# Patient Record
Sex: MALE | Race: WHITE | NOT HISPANIC OR LATINO | Employment: OTHER | URBAN - METROPOLITAN AREA
[De-identification: names, ages, dates, MRNs, and addresses within clinical notes are randomized per-mention and may not be internally consistent; named-entity substitution may affect disease eponyms.]

---

## 2021-02-18 ENCOUNTER — OFFICE VISIT (OUTPATIENT)
Dept: FAMILY MEDICINE | Facility: CLINIC | Age: 31
End: 2021-02-18
Payer: COMMERCIAL

## 2021-02-18 VITALS
DIASTOLIC BLOOD PRESSURE: 72 MMHG | TEMPERATURE: 98.2 F | RESPIRATION RATE: 16 BRPM | SYSTOLIC BLOOD PRESSURE: 125 MMHG | HEIGHT: 70 IN | OXYGEN SATURATION: 100 % | WEIGHT: 187 LBS | BODY MASS INDEX: 26.77 KG/M2 | HEART RATE: 66 BPM

## 2021-02-18 DIAGNOSIS — Z23 NEED FOR TETANUS BOOSTER: ICD-10-CM

## 2021-02-18 DIAGNOSIS — Z00.00 ROUTINE GENERAL MEDICAL EXAMINATION AT A HEALTH CARE FACILITY: Primary | ICD-10-CM

## 2021-02-18 PROCEDURE — 90471 IMMUNIZATION ADMIN: CPT | Performed by: PHYSICIAN ASSISTANT

## 2021-02-18 PROCEDURE — 90715 TDAP VACCINE 7 YRS/> IM: CPT | Performed by: PHYSICIAN ASSISTANT

## 2021-02-18 PROCEDURE — 99395 PREV VISIT EST AGE 18-39: CPT | Mod: 25 | Performed by: PHYSICIAN ASSISTANT

## 2021-02-18 ASSESSMENT — ENCOUNTER SYMPTOMS
SHORTNESS OF BREATH: 0
NERVOUS/ANXIOUS: 1
FREQUENCY: 0
SORE THROAT: 0
CONSTIPATION: 0
DIZZINESS: 0
MYALGIAS: 0
PALPITATIONS: 0
WEAKNESS: 0
DIARRHEA: 0
CHILLS: 0
HEMATURIA: 0
PARESTHESIAS: 0
HEARTBURN: 0
NAUSEA: 0
HEMATOCHEZIA: 0
JOINT SWELLING: 0
ARTHRALGIAS: 0
EYE PAIN: 0
COUGH: 0
DYSURIA: 0
FEVER: 0
ABDOMINAL PAIN: 0
HEADACHES: 0

## 2021-02-18 ASSESSMENT — MIFFLIN-ST. JEOR: SCORE: 1819.48
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
## 2021-02-18 NOTE — NURSING NOTE
Prior to immunization administration, verified patients identity using patient s name and date of birth. Please see Immunization Activity for additional information.     Screening Questionnaire for Adult Immunization    Are you sick today?   No   Do you have allergies to medications, food, a vaccine component or latex?   No   Have you ever had a serious reaction after receiving a vaccination?   No   Do you have a long-term health problem with heart, lung, kidney, or metabolic disease (e.g., diabetes), asthma, a blood disorder, no spleen, complement component deficiency, a cochlear implant, or a spinal fluid leak?  Are you on long-term aspirin therapy?   No   Do you have cancer, leukemia, HIV/AIDS, or any other immune system problem?   No   Do you have a parent, brother, or sister with an immune system problem?   No   In the past 3 months, have you taken medications that affect  your immune system, such as prednisone, other steroids, or anticancer drugs; drugs for the treatment of rheumatoid arthritis, Crohn s disease, or psoriasis; or have you had radiation treatments?   No   Have you had a seizure, or a brain or other nervous system problem?   No   During the past year, have you received a transfusion of blood or blood    products, or been given immune (gamma) globulin or antiviral drug?   No   For women: Are you pregnant or is there a chance you could become       pregnant during the next month?   No   Have you received any vaccinations in the past 4 weeks?   No     Immunization questionnaire answers were all negative.        Per orders of Dr. deleon, injection of tdap given by Reena Castellon MA. Patient instructed to remain in clinic for 15 minutes afterwards, and to report any adverse reaction to me immediately.       Screening performed by Reena Casteloln MA on 2/18/2021 at 4:04 PM.

## 2021-04-12 ENCOUNTER — OFFICE VISIT (OUTPATIENT)
Dept: FAMILY MEDICINE | Facility: CLINIC | Age: 31
End: 2021-04-12
Payer: COMMERCIAL

## 2021-04-12 VITALS
RESPIRATION RATE: 16 BRPM | HEIGHT: 70 IN | BODY MASS INDEX: 26.63 KG/M2 | SYSTOLIC BLOOD PRESSURE: 134 MMHG | DIASTOLIC BLOOD PRESSURE: 70 MMHG | WEIGHT: 186 LBS | HEART RATE: 87 BPM | TEMPERATURE: 98.5 F | OXYGEN SATURATION: 97 %

## 2021-04-12 DIAGNOSIS — R53.83 FATIGUE, UNSPECIFIED TYPE: Primary | ICD-10-CM

## 2021-04-12 DIAGNOSIS — R11.0 NAUSEA: ICD-10-CM

## 2021-04-12 DIAGNOSIS — R09.81 NASAL CONGESTION: ICD-10-CM

## 2021-04-12 DIAGNOSIS — F43.23 ADJUSTMENT DISORDER WITH MIXED ANXIETY AND DEPRESSED MOOD: ICD-10-CM

## 2021-04-12 PROCEDURE — U0005 INFEC AGEN DETEC AMPLI PROBE: HCPCS | Performed by: FAMILY MEDICINE

## 2021-04-12 PROCEDURE — U0003 INFECTIOUS AGENT DETECTION BY NUCLEIC ACID (DNA OR RNA); SEVERE ACUTE RESPIRATORY SYNDROME CORONAVIRUS 2 (SARS-COV-2) (CORONAVIRUS DISEASE [COVID-19]), AMPLIFIED PROBE TECHNIQUE, MAKING USE OF HIGH THROUGHPUT TECHNOLOGIES AS DESCRIBED BY CMS-2020-01-R: HCPCS | Performed by: FAMILY MEDICINE

## 2021-04-12 PROCEDURE — 99214 OFFICE O/P EST MOD 30 MIN: CPT | Performed by: FAMILY MEDICINE

## 2021-04-12 RX ORDER — LISINOPRIL AND HYDROCHLOROTHIAZIDE 12.5; 2 MG/1; MG/1
TABLET ORAL
COMMUNITY
End: 2021-05-29

## 2021-04-12 ASSESSMENT — ANXIETY QUESTIONNAIRES
7. FEELING AFRAID AS IF SOMETHING AWFUL MIGHT HAPPEN: MORE THAN HALF THE DAYS
3. WORRYING TOO MUCH ABOUT DIFFERENT THINGS: MORE THAN HALF THE DAYS
6. BECOMING EASILY ANNOYED OR IRRITABLE: SEVERAL DAYS
5. BEING SO RESTLESS THAT IT IS HARD TO SIT STILL: SEVERAL DAYS
IF YOU CHECKED OFF ANY PROBLEMS ON THIS QUESTIONNAIRE, HOW DIFFICULT HAVE THESE PROBLEMS MADE IT FOR YOU TO DO YOUR WORK, TAKE CARE OF THINGS AT HOME, OR GET ALONG WITH OTHER PEOPLE: VERY DIFFICULT
2. NOT BEING ABLE TO STOP OR CONTROL WORRYING: NEARLY EVERY DAY
1. FEELING NERVOUS, ANXIOUS, OR ON EDGE: NEARLY EVERY DAY
GAD7 TOTAL SCORE: 14

## 2021-04-12 ASSESSMENT — COLUMBIA-SUICIDE SEVERITY RATING SCALE - C-SSRS
2. IN THE PAST MONTH, HAVE YOU ACTUALLY HAD ANY THOUGHTS OF KILLING YOURSELF?: NO
1. WITHIN THE PAST MONTH, HAVE YOU WISHED YOU WERE DEAD OR WISHED YOU COULD GO TO SLEEP AND NOT WAKE UP?: YES
6. HAVE YOU EVER DONE ANYTHING, STARTED TO DO ANYTHING, OR PREPARED TO DO ANYTHING TO END YOUR LIFE?: NO

## 2021-04-12 ASSESSMENT — MIFFLIN-ST. JEOR: SCORE: 1814.94

## 2021-04-12 ASSESSMENT — PATIENT HEALTH QUESTIONNAIRE - PHQ9
SUM OF ALL RESPONSES TO PHQ QUESTIONS 1-9: 13
5. POOR APPETITE OR OVEREATING: MORE THAN HALF THE DAYS

## 2021-04-12 NOTE — PATIENT INSTRUCTIONS
Monitor your symptoms and if low mood, anxiety, and fatigue persist let me know as I would then recommend lab testing.      See the EAP therapist.  If mood and anxiety issues persist over the next few weeks let me know as we could consider another trial of citalopram.

## 2021-04-12 NOTE — PROGRESS NOTES
Assessment & Plan     Fatigue, unspecified type  Nausea  Nasal congestion  Onset of symptoms correlates with COVID vaccine #1 given last week but symptoms have been more persistent than expected for vaccine side effects and I think we need to rule out simultaneous COVID illness given the community prevalence.  His symptoms do seem to be improving, albeit slowly.  I recommended he continue to monitor these physical symptoms and if they persist we should consider additional workup (CMP, CBC, TSH).    - Symptomatic COVID-19 Virus (Coronavirus) by PCR    Adjustment disorder with mixed anxiety and depressed mood  He's been coping with many stressors over the winter (move to new state, new demanding job, pandemic).  He notes passive SI with no intent or plan.  I recommended that he start working with an EAP therapist that is available to him and monitor his symptoms.  He notes that the grey, rainy days this past week have been challenging and that [place] was much sunnier.  I am hopeful that with spring fast approaching the change in weather may also lighten his mood.  I recommended he reach out to me if still struggling with mood in the next few weeks and we could have him start citalopram at that time (citalopram as he responded well to it several years ago).         Depression Screening Follow Up  PHQ 4/12/2021   PHQ-9 Total Score 13   Q9: Thoughts of better off dead/self-harm past 2 weeks Several days     C-SSRS (Brief Stephenson) 4/12/2021   Within the last month, have you wished you were dead or wished you could go to sleep and not wake up? Yes   Within the last month, have you had any actual thoughts of killing yourself? No   Within the last month, have you ever done anything, started to do anything, or prepared to do anything to end your life? No     Follow Up  Follow Up Actions Taken  Crisis resource information provided in the After Visit Summary  Recommended counseling/therapy with EAP         39 minutes spent on  the date of the encounter doing chart review, history and exam, documentation and further activities per the note            Patient Instructions   Monitor your symptoms and if low mood, anxiety, and fatigue persist let me know as I would then recommend lab testing.      See the EAP therapist.  If mood and anxiety issues persist over the next few weeks let me know as we could consider another trial of citalopram.        No follow-ups on file.    Юлия Jones MD  [facility]        Subjective   AJ is a 30 year old who presents for the following health issues        Acute Illness  Onset/Duration: Wed 4/7/2020  Symptoms:  Fever: no  Chills/Sweats: YES- sensitive to cold  Headache (location?): no  Sinus Pressure: YES- yesterday  Conjunctivitis:  no  Ear Pain: no  Rhinorrhea: no  Congestion: YES  Sore Throat: no  Cough: YES - on wed but its gone now  Wheeze: no  Decreased Appetite: no  Nausea: YES  Vomiting: no  Diarrhea: no  Dysuria/Freq.: no  Dysuria or Hematuria: no  Fatigue/Achiness: YES  Sick/Strep Exposure: no  Therapies tried and outcome: took advil, breathing exercises, sleeping early, but not helping    Recently moved from [place] to [place] with his abhijit who works at [employer].      Anxiety and stress over the last 2 months. Patient states he got his first covid vaccine (Pfizer) 7 days ago (on 4/6) and felt tired that day. The next day on Wednesday he still felt tired, shaky and had a sore left arm. His legs feel weak and he's having a hard time concentrating at work, low mood, feels confused and feels like he's having anxiety and depressive thoughts. He is too tired to exercise which is how he's been coping with mood and anxiety issues the past couple of months. Fatigue is a bit better today.  He wonders the Covid Shot might be causing these symptoms.     Abnormal Mood Symptoms  Onset/Duration: months, worsened past week  Description:   Depression (if yes, do PHQ-9):  "YES  Anxiety (if yes, do MATTHEW-7): YES  Accompanying Signs & Symptoms:  Fatigue: YES  Difficulty concentrating: YES  Heart racing/beating fast: YES  Thoughts of hurting yourself or others: no  History:  Recent stress or major life event: YES- new job started in the fall, very demanding  Prior depression or anxiety: Yes - 2016, took citalopram for a year, responded well but gained weight  Family history of depression or anxiety: no - not diagnosed but dad may have had anxiety  Alcohol/drug use: YES- has cut back to 1-2 drinks/week  Difficulty sleeping: no    He describes his anxiety as overall worry but thinks he may have been close to having a panic attack once - heart was racing.  PHQ 4/12/2021   PHQ-9 Total Score 13   Q9: Thoughts of better off dead/self-harm past 2 weeks Several days     MATTHEW-7 SCORE 4/12/2021   Total Score 14         Review of Systems   as above       Objective    /70 (BP Location: Right arm, Patient Position: Sitting, Cuff Size: Adult Regular)   Pulse 87   Temp 98.5  F (36.9  C) (Temporal)   Resp 16   Ht 1.778 m (5' 10\")   Wt 84.4 kg (186 lb)   SpO2 97%   BMI 26.69 kg/m    Body mass index is 26.69 kg/m .  Physical Exam   GEN:  no apparent distress  EYES: sclerae and conjunctivae clear with no discharge  ENT: external ears and nose without lesions or scars, TM's and canals clear bilaterally and oropharynx clear with moist mucus membranes and normal landmarks  NECK:  Supple without adenopathy, mass, or thyromegaly  LUNGS:  normal respiratory effort, and lungs clear to auscultation bilaterally - no rales, rhonchi or wheezes  CV: regular rate and rhythm, normal S1 S2, no S3 or S4 and no murmur, click or rub  ABD:  soft, nontender, no mass, no hepatosplenomegaly, no hernias   PSYCH:    Appearance: appropriately and casually dressed    Eye Contact: good  Behavior: calm  Attitude: cooperative and attentive    Speech:  normal rate, rhythm, and tone    Thought Form: organized and goal oriented "    Thought Content: no evidence of suicidal ideation or homicidal ideation    Mood: anxious    Affect: mood congruent    Insight: good

## 2021-04-13 LAB
LABORATORY COMMENT REPORT: NORMAL
SARS-COV-2 RNA RESP QL NAA+PROBE: NEGATIVE
SARS-COV-2 RNA RESP QL NAA+PROBE: NORMAL
SPECIMEN SOURCE: NORMAL
SPECIMEN SOURCE: NORMAL

## 2021-04-13 ASSESSMENT — ANXIETY QUESTIONNAIRES: GAD7 TOTAL SCORE: 14

## 2021-04-13 NOTE — RESULT ENCOUNTER NOTE
Yosef STODDARD,  Good news!  Your COVID-19 test was negative.  Hopefully your physical symptoms following the immunization are continuing to improve.  Keep us posted if you continue to have symptoms or mood concerns.  Юлия Jones MD

## 2021-05-06 ENCOUNTER — TELEPHONE (OUTPATIENT)
Dept: FAMILY MEDICINE | Facility: CLINIC | Age: 31
End: 2021-05-06

## 2021-05-06 NOTE — TELEPHONE ENCOUNTER
"Per 4/12/21 office visit:  \"Monitor your symptoms and if low mood, anxiety, and fatigue persist let me know as I would then recommend lab testing.  \"    Clarification needed from patient if/what symptoms are present.    SUNNY TovarN, RN  [facility]    "

## 2021-05-06 NOTE — TELEPHONE ENCOUNTER
Reason for call:  Other   Patient called regarding (reason for call): appointment  Additional comments: Pt had evisit with Provider recently and  and was informed to have covid test done first to rule that out. Pt informed to contact Provider back to explore medication one Covid ruled out     Phone number to reach patient:  Home number on file [phone] (home)    Best Time:  today    Can we leave a detailed message on this number?  YES    Travel screening: Not Applicable

## 2021-05-06 NOTE — TELEPHONE ENCOUNTER
Pt called- States he is still feeling tired and anxious- Feels he needs to start on anxiety/depression medication. Appointment made for tomorrow. Sandrine Escalona RN

## 2021-05-07 ENCOUNTER — VIRTUAL VISIT (OUTPATIENT)
Dept: FAMILY MEDICINE | Facility: CLINIC | Age: 31
End: 2021-05-07
Payer: COMMERCIAL

## 2021-05-07 DIAGNOSIS — F43.23 ADJUSTMENT DISORDER WITH MIXED ANXIETY AND DEPRESSED MOOD: Primary | ICD-10-CM

## 2021-05-07 PROCEDURE — 99214 OFFICE O/P EST MOD 30 MIN: CPT | Mod: GT | Performed by: PHYSICIAN ASSISTANT

## 2021-05-07 RX ORDER — HYDROXYZINE PAMOATE 25 MG/1
25 CAPSULE ORAL 3 TIMES DAILY PRN
Qty: 20 CAPSULE | Refills: 1 | Status: SHIPPED | OUTPATIENT
Start: 2021-05-07 | End: 2023-06-14

## 2021-05-07 RX ORDER — CITALOPRAM HYDROBROMIDE 20 MG/1
20 TABLET ORAL DAILY
Qty: 60 TABLET | Refills: 0 | Status: SHIPPED | OUTPATIENT
Start: 2021-05-07 | End: 2021-05-19 | Stop reason: ALTCHOICE

## 2021-05-07 ASSESSMENT — ANXIETY QUESTIONNAIRES
5. BEING SO RESTLESS THAT IT IS HARD TO SIT STILL: SEVERAL DAYS
3. WORRYING TOO MUCH ABOUT DIFFERENT THINGS: MORE THAN HALF THE DAYS
IF YOU CHECKED OFF ANY PROBLEMS ON THIS QUESTIONNAIRE, HOW DIFFICULT HAVE THESE PROBLEMS MADE IT FOR YOU TO DO YOUR WORK, TAKE CARE OF THINGS AT HOME, OR GET ALONG WITH OTHER PEOPLE: VERY DIFFICULT
2. NOT BEING ABLE TO STOP OR CONTROL WORRYING: MORE THAN HALF THE DAYS
1. FEELING NERVOUS, ANXIOUS, OR ON EDGE: MORE THAN HALF THE DAYS
GAD7 TOTAL SCORE: 11
7. FEELING AFRAID AS IF SOMETHING AWFUL MIGHT HAPPEN: MORE THAN HALF THE DAYS
6. BECOMING EASILY ANNOYED OR IRRITABLE: SEVERAL DAYS

## 2021-05-07 ASSESSMENT — PATIENT HEALTH QUESTIONNAIRE - PHQ9
SUM OF ALL RESPONSES TO PHQ QUESTIONS 1-9: 12
5. POOR APPETITE OR OVEREATING: SEVERAL DAYS

## 2021-05-07 NOTE — PROGRESS NOTES
RICHI is a 30 year old who is being evaluated via a billable video visit.      How would you like to obtain your AVS? MyChart  If the video visit is dropped, the invitation should be resent by: Text to cell phone: [phone]  Will anyone else be joining your video visit? No      Video Start Time: 3:14 PM    Assessment & Plan     Adjustment disorder with mixed anxiety and depressed mood  Worsening depression and anxiety. Has been on Celexa in the past with good results. Will restart today. Follow up in 4-6 weeks for medication check and possible dose adjustment. Return to care sooner if any new or worsening symptoms. Continue to work on increasing physical activity. Planning to be around others, trip to visit family back home in [place]. Take hydroxyzine as needed for anxiety flares.   - citalopram (CELEXA) 20 MG tablet; Take 1 tablet (20 mg) by mouth daily  - hydrOXYzine (VISTARIL) 25 MG capsule; Take 1 capsule (25 mg) by mouth 3 times daily as needed for anxiety    39 minutes spent on the date of the encounter doing chart review, history and exam, documentation and further activities per the note       Depression Screening Follow Up  PHQ 5/7/2021   PHQ-9 Total Score 12   Q9: Thoughts of better off dead/self-harm past 2 weeks Several days     Last PHQ-9 5/7/2021   1.  Little interest or pleasure in doing things 1   2.  Feeling down, depressed, or hopeless 2   3.  Trouble falling or staying asleep, or sleeping too much 2   4.  Feeling tired or having little energy 2   5.  Poor appetite or overeating 0   6.  Feeling bad about yourself 3   7.  Trouble concentrating 1   8.  Moving slowly or restless 0   Q9: Thoughts of better off dead/self-harm past 2 weeks 1   PHQ-9 Total Score 12   Difficulty at work, home, or with people Somewhat difficult     Follow Up      Follow Up Actions Taken  Referred patient back to mental health provider    Discussed the following ways the patient can remain in a safe environment:  remove  alcohol and be around others    No follow-ups on file.    HONEY Rueda [facility]    Reid STODDARD is a 30 year old who presents for the following health issues     HPI     PHQ9 and GAD7 complete in chart.    Abnormal Mood Symptoms  Onset/Duration: January/February 2021  Description:   Depression (if yes, do PHQ-9): YES  Anxiety (if yes, do MATTHEW-7): YES  Accompanying Signs & Symptoms:  Still participating in activities that you used to enjoy: YES  Fatigue: YES quite often  Irritability: YES- sometimes, out of character for him  Difficulty concentrating: YES- half of the time  Changes in appetite: YES- eating a bit less  Problems with sleep: YES- restless sleep, still tired after sleep  Heart racing/beating fast: YES- very minor  Abnormally elevated, expansive, or irritable mood: no  Persistently increased activity or energy: no  Thoughts of hurting yourself or others: no  History:  Recent stress or major life event: YES  Prior depression or anxiety: yes in 2016/2017 was on citalopram  Family history of depression or anxiety: yes - younger sister has diagnosed depression  Alcohol/drug use: YES- but alcohol use has gone way down to just 2-3 drinks per week  Difficulty sleeping: falls asleep ok   Precipitating or alleviating factors: None  Therapies tried and outcome: lifestyle changes and yoga, exercising more gym/biking.    Has cut down to 2-3 alcoholic drinks per week   Increasing his activity level   New job is very taxing and demanding   Emotions all hitting him at once after getting COVID vaccine  After first pfizer vaccine dose caused some physical fatigue and tiredness which seems to brought on depression, sadness, anxiety   Has been seeing therapist which has been helping   Some days feeling so tired fatigued and down, randomly crying some days, hard to concentrate    Feels like impacting work -- his boss has been very supportive   Had similar period of this in [id number]    Took citalopram at that time which seemed to help, did gain some weight but helped him get back on track   Denies SI no plan       PHQ 4/12/2021 5/7/2021   PHQ-9 Total Score 13 12   Q9: Thoughts of better off dead/self-harm past 2 weeks Several days Several days     MATTHEW-7 SCORE 4/12/2021 5/7/2021   Total Score 14 11       Review of Systems   Constitutional, HEENT, cardiovascular, pulmonary, gi and gu systems are negative, except as otherwise noted.      Objective           Vitals:  No vitals were obtained today due to virtual visit.    Physical Exam   GENERAL: Healthy, alert and no distress  EYES: Eyes grossly normal to inspection.  No discharge or erythema, or obvious scleral/conjunctival abnormalities.  RESP: No audible wheeze, cough, or visible cyanosis.  No visible retractions or increased work of breathing.    SKIN: Visible skin clear. No significant rash, abnormal pigmentation or lesions.  NEURO: Cranial nerves grossly intact.  Mentation and speech appropriate for age.  PSYCH: Mentation appears normal, affect normal/bright, judgement and insight intact, normal speech and appearance well-groomed.                Video-Visit Details    Type of service:  Video Visit    Video End Time:3:48 PM    Originating Location (pt. Location): Home    Distant Location (provider location):  [facility]     Platform used for Video Visit: [employer]

## 2021-05-08 ASSESSMENT — ANXIETY QUESTIONNAIRES: GAD7 TOTAL SCORE: 11

## 2021-05-11 ENCOUNTER — NURSE TRIAGE (OUTPATIENT)
Dept: NURSING | Facility: CLINIC | Age: 31
End: 2021-05-11

## 2021-05-11 NOTE — TELEPHONE ENCOUNTER
Patient's fiance Mercedes calling regarding patient's symptoms. Reports patient started Citalopram 5/7 and started to have increased anxiety yesterday, took PRN Vistaril to help but this made patient sleepy, more tearful with insomnia and crazy thoughts overnight. Reports today patient had to call into work, reports chills, fatigue, anxiety and restless legs. Mercedes reports symptoms are severe but patient would be able to get out of bed to stand. Advised per protocol for patient to be seen today in office or urgent care. Mercedes agreeable and will drive patient to urgent care.     Emily Noel RN 05/11/21 9:18 AM   [facility] Triage Nurse Advisor        Reason for Disposition    MODERATE weakness (i.e., interferes with work, school, normal activities) and cause unknown (Exceptions: weakness with acute minor illness, or weakness from poor fluid intake)    Additional Information    Negative: Severe difficulty breathing (e.g., struggling for each breath, speaks in single words)    Negative: Shock suspected (e.g., cold/pale/clammy skin, too weak to stand, low BP, rapid pulse)    Negative: Difficult to awaken or acting confused (e.g., disoriented, slurred speech)    Negative: Fainted > 15 minutes ago and still feels too weak or dizzy to stand    Negative: SEVERE weakness (i.e., unable to walk or barely able to walk, requires support) and new onset or worsening    Negative: Sounds like a life-threatening emergency to the triager    Negative: Weakness of the face, arm or leg on one side of the body    Negative: Has diabetes and weakness from low blood sugar (i.e., < 60 mg/dL or 3.5 mmol/L)    Negative: Recent heat exposure, suspected cause of weakness    Negative: Vomiting is the main symptom    Negative: Diarrhea is the main symptom    Negative: Difficulty breathing    Negative: Heart beating < 50 beats per minute OR > 140 beats per minute    Negative: Extra heartbeats OR irregular heart beating (i.e., 'palpitations')     Negative: Follows bleeding (e.g., from vomiting, rectum, vagina) (Exception: small transient weakness from sight of a small amount blood)    Negative: Bloody, black, or tarry bowel movements (Exception: chronic-unchanged  black-grey bowel movements and is taking iron pills or Pepto-bismol)    Negative: MODERATE weakness from poor fluid intake with no improvement after 2 hours of rest and fluids    Negative: Drinking very little and dehydration suspected (e.g., no urine > 12 hours, very dry mouth, very lightheaded)    Negative: Patient sounds very sick or weak to the triager    Protocols used: WEAKNESS (GENERALIZED) AND FATIGUE-A-OH    COVID 19 Nurse Triage Plan/Patient Instructions    Please be aware that novel coronavirus (COVID-19) may be circulating in the community. If you develop symptoms such as fever, cough, or SOB or if you have concerns about the presence of another infection including coronavirus (COVID-19), please contact your health care provider or visit [email or web address].     Disposition/Instructions    In-Person Visit with provider recommended. Reference Visit Selection Guide.    Thank you for taking steps to prevent the spread of this virus.  o Limit your contact with others.  o Wear a simple mask to cover your cough.  o Wash your hands well and often.    Resources    [facility]: About COVID-19: [email or web address]    CDC: What to Do If You're Sick: www.cdc.gov/coronavirus/2019-ncov/about/steps-when-sick.html    CDC: Ending Home Isolation: www.cdc.gov/coronavirus/2019-ncov/hcp/disposition-in-home-patients.html     CDC: Caring for Someone: www.cdc.gov/coronavirus/2019-ncov/if-you-are-sick/care-for-someone.html     [facility]: Interim Guidance for Hospital Discharge to Home: www.health.[facility].mn.us/diseases/coronavirus/hcp/hospdischarge.pdf    [facility] clinical trials (COVID-19 research studies): clinicalaffairs.[facility]/umn-clinical-trials     Below are the  COVID-19 hotlines at the [place] Department of Health ([facility]). Interpreters are available.   o For health questions: Call 486-326-5883 or 1-265.423.7160 (7 a.m. to 7 p.m.)  o For questions about schools and childcare: Call 152-415-4233 or 1-326.863.5016 (7 a.m. to 7 p.m.)

## 2021-05-19 ENCOUNTER — NURSE TRIAGE (OUTPATIENT)
Dept: NURSING | Facility: CLINIC | Age: 31
End: 2021-05-19

## 2021-05-19 ENCOUNTER — OFFICE VISIT (OUTPATIENT)
Dept: FAMILY MEDICINE | Facility: CLINIC | Age: 31
End: 2021-05-19
Payer: COMMERCIAL

## 2021-05-19 VITALS
OXYGEN SATURATION: 97 % | SYSTOLIC BLOOD PRESSURE: 138 MMHG | WEIGHT: 181 LBS | DIASTOLIC BLOOD PRESSURE: 82 MMHG | HEART RATE: 73 BPM | TEMPERATURE: 97.8 F | BODY MASS INDEX: 25.97 KG/M2 | RESPIRATION RATE: 16 BRPM

## 2021-05-19 DIAGNOSIS — R53.83 FATIGUE, UNSPECIFIED TYPE: ICD-10-CM

## 2021-05-19 DIAGNOSIS — F43.23 ADJUSTMENT DISORDER WITH MIXED ANXIETY AND DEPRESSED MOOD: Primary | ICD-10-CM

## 2021-05-19 LAB
BASOPHILS # BLD AUTO: 0 10E9/L (ref 0–0.2)
BASOPHILS NFR BLD AUTO: 0.5 %
DIFFERENTIAL METHOD BLD: NORMAL
EOSINOPHIL # BLD AUTO: 0 10E9/L (ref 0–0.7)
EOSINOPHIL NFR BLD AUTO: 0.4 %
ERYTHROCYTE [DISTWIDTH] IN BLOOD BY AUTOMATED COUNT: 11.8 % (ref 10–15)
HCT VFR BLD AUTO: 45.6 % (ref 40–53)
HGB BLD-MCNC: 15.6 G/DL (ref 13.3–17.7)
LYMPHOCYTES # BLD AUTO: 1.8 10E9/L (ref 0.8–5.3)
LYMPHOCYTES NFR BLD AUTO: 23.3 %
MCH RBC QN AUTO: 29.2 PG (ref 26.5–33)
MCHC RBC AUTO-ENTMCNC: 34.2 G/DL (ref 31.5–36.5)
MCV RBC AUTO: 85 FL (ref 78–100)
MONOCYTES # BLD AUTO: 0.4 10E9/L (ref 0–1.3)
MONOCYTES NFR BLD AUTO: 5.5 %
NEUTROPHILS # BLD AUTO: 5.5 10E9/L (ref 1.6–8.3)
NEUTROPHILS NFR BLD AUTO: 70.3 %
PLATELET # BLD AUTO: 335 10E9/L (ref 150–450)
RBC # BLD AUTO: 5.34 10E12/L (ref 4.4–5.9)
WBC # BLD AUTO: 7.9 10E9/L (ref 4–11)

## 2021-05-19 PROCEDURE — 96127 BRIEF EMOTIONAL/BEHAV ASSMT: CPT | Performed by: PHYSICIAN ASSISTANT

## 2021-05-19 PROCEDURE — 80050 GENERAL HEALTH PANEL: CPT | Performed by: PHYSICIAN ASSISTANT

## 2021-05-19 PROCEDURE — 36415 COLL VENOUS BLD VENIPUNCTURE: CPT | Performed by: PHYSICIAN ASSISTANT

## 2021-05-19 PROCEDURE — 82306 VITAMIN D 25 HYDROXY: CPT | Performed by: PHYSICIAN ASSISTANT

## 2021-05-19 PROCEDURE — 99215 OFFICE O/P EST HI 40 MIN: CPT | Performed by: PHYSICIAN ASSISTANT

## 2021-05-19 RX ORDER — LORAZEPAM 0.5 MG/1
0.5 TABLET ORAL EVERY 6 HOURS PRN
Qty: 10 TABLET | Refills: 0 | Status: SHIPPED | OUTPATIENT
Start: 2021-05-19 | End: 2021-05-29

## 2021-05-19 RX ORDER — ESCITALOPRAM OXALATE 5 MG/1
5 TABLET ORAL DAILY
Qty: 30 TABLET | Refills: 1 | Status: SHIPPED | OUTPATIENT
Start: 2021-05-19 | End: 2021-05-19

## 2021-05-19 RX ORDER — FLUOXETINE 10 MG/1
CAPSULE ORAL
Qty: 60 CAPSULE | Refills: 0 | Status: SHIPPED | OUTPATIENT
Start: 2021-05-19 | End: 2021-06-18 | Stop reason: DRUGHIGH

## 2021-05-19 ASSESSMENT — ANXIETY QUESTIONNAIRES
3. WORRYING TOO MUCH ABOUT DIFFERENT THINGS: MORE THAN HALF THE DAYS
IF YOU CHECKED OFF ANY PROBLEMS ON THIS QUESTIONNAIRE, HOW DIFFICULT HAVE THESE PROBLEMS MADE IT FOR YOU TO DO YOUR WORK, TAKE CARE OF THINGS AT HOME, OR GET ALONG WITH OTHER PEOPLE: EXTREMELY DIFFICULT
2. NOT BEING ABLE TO STOP OR CONTROL WORRYING: MORE THAN HALF THE DAYS
7. FEELING AFRAID AS IF SOMETHING AWFUL MIGHT HAPPEN: MORE THAN HALF THE DAYS
GAD7 TOTAL SCORE: 13
6. BECOMING EASILY ANNOYED OR IRRITABLE: SEVERAL DAYS
1. FEELING NERVOUS, ANXIOUS, OR ON EDGE: MORE THAN HALF THE DAYS
5. BEING SO RESTLESS THAT IT IS HARD TO SIT STILL: MORE THAN HALF THE DAYS

## 2021-05-19 ASSESSMENT — PATIENT HEALTH QUESTIONNAIRE - PHQ9
SUM OF ALL RESPONSES TO PHQ QUESTIONS 1-9: 19
5. POOR APPETITE OR OVEREATING: MORE THAN HALF THE DAYS

## 2021-05-19 NOTE — TELEPHONE ENCOUNTER
RX not at pharmacy; appears e scribing ability is not working for  [facility];   RX called in;E scribe was received by pharmacy but was delayed      Shandra Dougherty RN  FNA      Medication Changes       FLUoxetine HCl 10 MG Take 1 capsule daily x 1 week then 2 capsules daily     LORazepam 0.5 mg Oral EVERY 6 HOURS PRN  Shandra Dougherty RN  FNA     Reason for Disposition    [1] Prescription prescribed recently is not at pharmacy AND [2] triager has access to patient's EMR AND [3] prescription is recorded in the EMR    Protocols used: MEDICATION QUESTION CALL-A-AH

## 2021-05-19 NOTE — TELEPHONE ENCOUNTER
RICHI is taking medication for anxiety and is taking celexa and is having intense side effects.  Fatigue and no control of his emotions.  Today is still fatigued and coffee does not work.  RICHI is having troubles concentrating.  RICHI is requesting to speak with Nehemiah Wilder PA-C about his medication.  Please phone RICHI.    COVID 19 Nurse Triage Plan/Patient Instructions    Please be aware that novel coronavirus (COVID-19) may be circulating in the community. If you develop symptoms such as fever, cough, or SOB or if you have concerns about the presence of another infection including coronavirus (COVID-19), please contact your health care provider or visit [email or web address].     Disposition/Instructions    Home care recommended. Follow home care protocol based instructions.    Thank you for taking steps to prevent the spread of this virus.  o Limit your contact with others.  o Wear a simple mask to cover your cough.  o Wash your hands well and often.    Resources    [facility]: About COVID-19: [email or web address] [email or web address]    CDC: What to Do If You're Sick: www.cdc.gov/coronavirus/2019-ncov/about/steps-when-sick.html    CDC: Ending Home Isolation: www.cdc.gov/coronavirus/2019-ncov/hcp/disposition-in-home-patients.html     CDC: Caring for Someone: www.cdc.gov/coronavirus/2019-ncov/if-you-are-sick/care-for-someone.html     [facility]: Interim Guidance for Hospital Discharge to Home: www.health.[facility].mn.us/diseases/coronavirus/hcp/hospdischarge.pdf    [facility] clinical trials (COVID-19 research studies): [email or web address].[facility]/umn-clinical-trials     Below are the COVID-19 hotlines at the [place] Department of Health ([facility]). Interpreters are available.   o For health questions: Call 614-721-3443 or 1-458.777.7098 (7 a.m. to 7 p.m.)  o For questions about schools and childcare: Call 563-446-4994 or 1-493.438.1115 (7 a.m. to 7 p.m.)

## 2021-05-19 NOTE — PROGRESS NOTES
Assessment & Plan     Adjustment disorder with mixed anxiety and depressed mood  Fatigue, unspecified type  Despite previously doing well on Citalopram years ago the recent initiation of this medication appears to have worsened RICHI's anxiety and depression symptoms. We agreed to discontinue immediately. His sister has done well on Fluoxetine for mood. We agreed to try this. Will initiate at low dose 10 mg daily x 1 week then increase to 20 mg daily. Short course of Ativan for severe anxiety symptoms. RICHI denies brandi or hypomania symptoms. Follow up appointment for 4 weeks out scheduled. Lab workup pending. Chart sent to Kalin Mccall [facility] for counseling services. Encouraged RICHI to focus on regular exercise, meditation/mindfulness practice via smartphone apps, be around others and reach out for help (has good familial support). Continue to avoid alcohol. He denies active SI or plan. Will reach out via [employer] or call triage if new/worsening symptoms/questions/concerns.   - CBC with platelets and differential  - Comprehensive metabolic panel (BMP + Alb, Alk Phos, ALT, AST, Total. Bili, TP)  - TSH with free T4 reflex  - Vitamin D Deficiency  - FLUoxetine (PROZAC) 10 MG capsule; Take 1 capsule daily x 1 week then 2 capsules daily  - LORazepam (ATIVAN) 0.5 MG tablet; Take 1 tablet (0.5 mg) by mouth every 6 hours as needed for anxiety    45 minutes spent on the date of the encounter doing chart review, history and exam, documentation and further activities per the note     Depression Screening Follow Up    PHQ 5/19/2021   PHQ-9 Total Score 19   Q9: Thoughts of better off dead/self-harm past 2 weeks Several days       Return in about 4 weeks (around 6/16/2021).    Nehemiah Wilder PA-C  [facility]    Subjective   RICHI is a 30 year old who presents for the following health issues     HPI     Medication check  Started Citalopram 1.5 weeks ago   Felt like he noticed an improvement the first few days  "-- more motivated got some exercise and was able to focus on work  Then had disagreement with fijudith and felt like he could not control his emotions, became very tearful and anxious, took hydroxyzine and fell asleep  Since that time feeling more anxious, sometimes paralyzed like cannot move but at same time restless with moving arms and legs also some warm and cold spells   Having to call into work, feeling drained, cannot focus  Did take trip to [place] to visit family  Stepping away from work was helpful, able to rest and recharge   However once home and back at work having a hard time concentrating, cannot maintain focus   Generally feeling overwhelmed  At times when anxiety is high will experience chest tightness and tingling in fingertips   Feeling easily brought to tears, thinks Citalopram has made him experience more extremes of negative emotions   Sleep has been OK but will wake up before alarm feeling anxious   Did some free sessions of counseling but those are no longer available   Denies periods of brandi or hypomania    His sister does take Prozac for mood which has been helpful  AJ admits the word \"suicide\" ran through his brain on his worst days but denies any concrete thoughts of himself dying or plan.  He is using a mindfulness/relaxation mey for his phone which also helps     Review of Systems   Constitutional, HEENT, cardiovascular, pulmonary, gi and gu systems are negative, except as otherwise noted.      Objective    /82 (BP Location: Right arm, Patient Position: Sitting, Cuff Size: Adult Regular)   Pulse 73   Temp 97.8  F (36.6  C) (Tympanic)   Resp 16   Wt 82.1 kg (181 lb)   SpO2 97%   BMI 25.97 kg/m    Body mass index is 25.97 kg/m .  Physical Exam  Vitals signs and nursing note reviewed.   Constitutional:       Appearance: Normal appearance.   HENT:      Head: Normocephalic and atraumatic.   Eyes:      Conjunctiva/sclera: Conjunctivae normal.      Pupils: Pupils are equal, round, " and reactive to light.   Neck:      Musculoskeletal: Neck supple.   Cardiovascular:      Rate and Rhythm: Normal rate and regular rhythm.      Heart sounds: Normal heart sounds.   Pulmonary:      Effort: Pulmonary effort is normal.      Breath sounds: Normal breath sounds.   Neurological:      General: No focal deficit present.      Mental Status: He is alert.   Psychiatric:         Mood and Affect: Mood normal.         Behavior: Behavior normal.            Results for orders placed or performed in visit on 05/19/21 (from the past 24 hour(s))   CBC with platelets and differential   Result Value Ref Range    WBC 7.9 4.0 - 11.0 10e9/L    RBC Count 5.34 4.4 - 5.9 10e12/L    Hemoglobin 15.6 13.3 - 17.7 g/dL    Hematocrit 45.6 40.0 - 53.0 %    MCV 85 78 - 100 fl    MCH 29.2 26.5 - 33.0 pg    MCHC 34.2 31.5 - 36.5 g/dL    RDW 11.8 10.0 - 15.0 %    Platelet Count 335 150 - 450 10e9/L    % Neutrophils 70.3 %    % Lymphocytes 23.3 %    % Monocytes 5.5 %    % Eosinophils 0.4 %    % Basophils 0.5 %    Absolute Neutrophil 5.5 1.6 - 8.3 10e9/L    Absolute Lymphocytes 1.8 0.8 - 5.3 10e9/L    Absolute Monocytes 0.4 0.0 - 1.3 10e9/L    Absolute Eosinophils 0.0 0.0 - 0.7 10e9/L    Absolute Basophils 0.0 0.0 - 0.2 10e9/L    Diff Method Automated Method

## 2021-05-20 ENCOUNTER — TELEPHONE (OUTPATIENT)
Dept: FAMILY MEDICINE | Facility: CLINIC | Age: 31
End: 2021-05-20

## 2021-05-20 LAB
ALBUMIN SERPL-MCNC: 4.5 G/DL (ref 3.4–5)
ALP SERPL-CCNC: 70 U/L (ref 40–150)
ALT SERPL W P-5'-P-CCNC: 28 U/L (ref 0–70)
ANION GAP SERPL CALCULATED.3IONS-SCNC: 4 MMOL/L (ref 3–14)
AST SERPL W P-5'-P-CCNC: 19 U/L (ref 0–45)
BILIRUB SERPL-MCNC: 0.7 MG/DL (ref 0.2–1.3)
BUN SERPL-MCNC: 16 MG/DL (ref 7–30)
CALCIUM SERPL-MCNC: 9.3 MG/DL (ref 8.5–10.1)
CHLORIDE SERPL-SCNC: 106 MMOL/L (ref 94–109)
CO2 SERPL-SCNC: 28 MMOL/L (ref 20–32)
CREAT SERPL-MCNC: 0.97 MG/DL (ref 0.66–1.25)
GFR SERPL CREATININE-BSD FRML MDRD: >90 ML/MIN/{1.73_M2}
GLUCOSE SERPL-MCNC: 90 MG/DL (ref 70–99)
POTASSIUM SERPL-SCNC: 4.3 MMOL/L (ref 3.4–5.3)
PROT SERPL-MCNC: 7.9 G/DL (ref 6.8–8.8)
SODIUM SERPL-SCNC: 138 MMOL/L (ref 133–144)
TSH SERPL DL<=0.005 MIU/L-ACNC: 2.03 MU/L (ref 0.4–4)

## 2021-05-20 ASSESSMENT — ANXIETY QUESTIONNAIRES: GAD7 TOTAL SCORE: 13

## 2021-05-20 NOTE — TELEPHONE ENCOUNTER
Pt dropped off Disability Forms from [employer]. Please fax to [phone] after Nehemiah Wilder completes filling out forms.   Breath sounds clear and equal bilaterally.

## 2021-05-21 ENCOUNTER — TELEPHONE (OUTPATIENT)
Dept: FAMILY MEDICINE | Facility: CLINIC | Age: 31
End: 2021-05-21

## 2021-05-21 LAB — DEPRECATED CALCIDIOL+CALCIFEROL SERPL-MC: 26 UG/L (ref 20–75)

## 2021-05-21 NOTE — TELEPHONE ENCOUNTER
Faxed form authorization for release of protected health information to medical  Records department

## 2021-05-25 ENCOUNTER — MYC MEDICAL ADVICE (OUTPATIENT)
Dept: FAMILY MEDICINE | Facility: CLINIC | Age: 31
End: 2021-05-25

## 2021-05-25 NOTE — TELEPHONE ENCOUNTER
Nehemiah,      Did you get this form? Or do you need pt to make appt for this?       5/20/21 4:18 PM  Note     Pt dropped off Disability Forms from [employer]. Please fax to [phone] after Nehemiah Wilder completes filling out forms.             I could not find this form anywhere. Pt was given an appt for Friday and awaiting his confirmation but let us know if you did this already    Renate Pierce, RN, BSN  [facility]

## 2021-05-26 NOTE — TELEPHONE ENCOUNTER
Writer responded via [employer].    SUNNY TovarN, RN  [facility]

## 2021-05-28 ENCOUNTER — NURSE TRIAGE (OUTPATIENT)
Dept: NURSING | Facility: CLINIC | Age: 31
End: 2021-05-28

## 2021-05-28 ENCOUNTER — OFFICE VISIT (OUTPATIENT)
Dept: FAMILY MEDICINE | Facility: CLINIC | Age: 31
End: 2021-05-28
Payer: COMMERCIAL

## 2021-05-28 VITALS
TEMPERATURE: 97 F | SYSTOLIC BLOOD PRESSURE: 131 MMHG | RESPIRATION RATE: 14 BRPM | BODY MASS INDEX: 25.83 KG/M2 | HEART RATE: 71 BPM | WEIGHT: 180 LBS | DIASTOLIC BLOOD PRESSURE: 83 MMHG | OXYGEN SATURATION: 99 %

## 2021-05-28 DIAGNOSIS — F43.23 ADJUSTMENT DISORDER WITH MIXED ANXIETY AND DEPRESSED MOOD: Primary | ICD-10-CM

## 2021-05-28 PROCEDURE — 99213 OFFICE O/P EST LOW 20 MIN: CPT | Performed by: PHYSICIAN ASSISTANT

## 2021-05-28 ASSESSMENT — ANXIETY QUESTIONNAIRES
GAD7 TOTAL SCORE: 10
6. BECOMING EASILY ANNOYED OR IRRITABLE: SEVERAL DAYS
3. WORRYING TOO MUCH ABOUT DIFFERENT THINGS: NEARLY EVERY DAY
7. FEELING AFRAID AS IF SOMETHING AWFUL MIGHT HAPPEN: MORE THAN HALF THE DAYS
1. FEELING NERVOUS, ANXIOUS, OR ON EDGE: MORE THAN HALF THE DAYS
GAD7 TOTAL SCORE: 10
5. BEING SO RESTLESS THAT IT IS HARD TO SIT STILL: NOT AT ALL
4. TROUBLE RELAXING: SEVERAL DAYS
GAD7 TOTAL SCORE: 10
2. NOT BEING ABLE TO STOP OR CONTROL WORRYING: SEVERAL DAYS
7. FEELING AFRAID AS IF SOMETHING AWFUL MIGHT HAPPEN: MORE THAN HALF THE DAYS

## 2021-05-28 ASSESSMENT — PATIENT HEALTH QUESTIONNAIRE - PHQ9
SUM OF ALL RESPONSES TO PHQ QUESTIONS 1-9: 14
10. IF YOU CHECKED OFF ANY PROBLEMS, HOW DIFFICULT HAVE THESE PROBLEMS MADE IT FOR YOU TO DO YOUR WORK, TAKE CARE OF THINGS AT HOME, OR GET ALONG WITH OTHER PEOPLE: VERY DIFFICULT
SUM OF ALL RESPONSES TO PHQ QUESTIONS 1-9: 14

## 2021-05-28 NOTE — PROGRESS NOTES
"Answers for HPI/ROS submitted by the patient on 5/28/2021   If you checked off any problems, how difficult have these problems made it for you to do your work, take care of things at home, or get along with other people?: Very difficult  PHQ9 TOTAL SCORE: 14  MATTHEW 7 TOTAL SCORE: 10        Assessment & Plan     Adjustment disorder with mixed anxiety and depressed mood  Paperwork for leave of absence completed today.  RICHI has noted some improvement since starting Fluoxetine. Just increased dose from 10 mg daily to 20 mg daily. We will check in on this in the next 4-6 weeks.   As noted below there continues to be some SI but AJ reports no plan. More of a passive thought or the word \"suicide\" will cross his mind.   PHQ 6/1/2021   PHQ-9 Total Score 13   Q9: Thoughts of better off dead/self-harm past 2 weeks Several days   F/U: Thoughts of suicide or self-harm No   F/U: Safety concerns No   Follow up as planned with Kalin FAJARDO on 6/1/21.     Follow Up      Follow Up Actions Taken  Referred patient back to mental health provider    Discussed the following ways the patient can remain in a safe environment:  remove alcohol and be around others    Return for medication check.    Nehemiah Wilder PA-C  [facility]    Subjective   AJ is a 30 year old who presents for the following health issues     HPI   Anxiety/depression follow up  AJ is a pleasant 30 year old male presenting today to follow up on Fluoxetine and to complete paperwork   We recently switched from Citalopram to Fluoxetine  Did not tolerate Citalopram   Fluoxetine seems to be helping -- not having side effects like he did with Citalopram  We started Fluoxetine at 10 mg daily for 1 week and are now increasing dose to 20 mg daily.   Sleep seems to be better   Some passive SI but no plan  Has been trying to get out for more regular exercise, catherine weather seems to help   Has follow up appt with mental health provider Kalin Mccall " [facility]  We checked lab work on 5/19/21 which showed normal CBC, CMP, TSH, and vit d.    Planning to take some time off work. Still not back to a level where he can focus on job demands. Wanting to address mental health and not rush back too soon. Needs paperwork completed today.     Review of Systems   Constitutional, HEENT, cardiovascular, pulmonary, gi and gu systems are negative, except as otherwise noted.      Objective    /83 (BP Location: Left arm, Patient Position: Sitting, Cuff Size: Adult Regular)   Pulse 71   Temp 97  F (36.1  C) (Tympanic)   Resp 14   Wt 81.6 kg (180 lb)   SpO2 99%   BMI 25.83 kg/m    Body mass index is 25.83 kg/m .  Physical Exam  Vitals signs and nursing note reviewed.   Constitutional:       Appearance: Normal appearance.   HENT:      Head: Normocephalic and atraumatic.   Pulmonary:      Effort: Pulmonary effort is normal.   Neurological:      General: No focal deficit present.      Mental Status: He is alert. Mental status is at baseline.   Psychiatric:         Mood and Affect: Mood normal.         Behavior: Behavior normal.            PHQ-9 / MATTHEW-7 Scores 8/2015 to present 4/12/2021 4/12/2021 5/7/2021   MATTHEW-7 Score  14  11   MATTHEW-7 Score [facility]      MATTHEW-7 Score Doc[employer]  14 11   [facility]9 [facility]      PHQ-9 Score DocFlow  13 12       Component      Latest Ref Rng & Units 5/19/2021   Vitamin D Deficiency screening      20 - 75 ug/L 26     Component      Latest Ref Rng & Units 5/19/2021   TSH      0.40 - 4.00 mU/L 2.03     Component      Latest Ref Rng & Units 5/19/2021   Sodium      133 - 144 mmol/L 138   Potassium      3.4 - 5.3 mmol/L 4.3   Chloride      94 - 109 mmol/L 106   Carbon Dioxide      20 - 32 mmol/L 28   Anion Gap      3 - 14 mmol/L 4   Glucose      70 - 99 mg/dL 90   Urea Nitrogen      7 - 30 mg/dL 16   Creatinine      0.66 - 1.25 mg/dL 0.97   GFR Estimate      >60 mL/min/1.73:m2 >90   GFR Estimate If Black      >60 mL/min/1.73:m2 >90   Calcium      8.5 -  10.1 mg/dL 9.3   Bilirubin Total      0.2 - 1.3 mg/dL 0.7   Albumin      3.4 - 5.0 g/dL 4.5   Protein Total      6.8 - 8.8 g/dL 7.9   Alkaline Phosphatase      40 - 150 U/L 70   ALT      0 - 70 U/L 28   AST      0 - 45 U/L 19     Component      Latest Ref Rng & Units 5/19/2021   WBC      4.0 - 11.0 10e9/L 7.9   RBC Count      4.4 - 5.9 10e12/L 5.34   Hemoglobin      13.3 - 17.7 g/dL 15.6   Hematocrit      40.0 - 53.0 % 45.6   MCV      78 - 100 fl 85   MCH      26.5 - 33.0 pg 29.2   MCHC      31.5 - 36.5 g/dL 34.2   RDW      10.0 - 15.0 % 11.8   Platelet Count      150 - 450 10e9/L 335   % Neutrophils      % 70.3   % Lymphocytes      % 23.3   % Monocytes      % 5.5   % Eosinophils      % 0.4   % Basophils      % 0.5   Absolute Neutrophil      1.6 - 8.3 10e9/L 5.5   Absolute Lymphocytes      0.8 - 5.3 10e9/L 1.8   Absolute Monocytes      0.0 - 1.3 10e9/L 0.4   Absolute Eosinophils      0.0 - 0.7 10e9/L 0.0   Absolute Basophils      0.0 - 0.2 10e9/L 0.0   Diff Method       Automated Method

## 2021-05-28 NOTE — TELEPHONE ENCOUNTER
RN triage   Call from pt   Pt states he was in clinic last week and filled out release of information forms for the 3rd party  of his workers comp request   Pt states he also filled out clinic 's form for release of information   Pt states the 3rd party workers comp has not received needed informaton    Pt states he call Medical Information and told thy do no have release fo informtion     Please assist/advise and call pt back.      Additional Information    Nursing judgment    Protocols used: NO PROTOCOL AVAILABLE - INFORMATION ONLY-A-OH

## 2021-05-29 ASSESSMENT — ANXIETY QUESTIONNAIRES: GAD7 TOTAL SCORE: 10

## 2021-05-29 ASSESSMENT — PATIENT HEALTH QUESTIONNAIRE - PHQ9: SUM OF ALL RESPONSES TO PHQ QUESTIONS 1-9: 14

## 2021-06-01 ENCOUNTER — VIRTUAL VISIT (OUTPATIENT)
Dept: BEHAVIORAL HEALTH | Facility: CLINIC | Age: 31
End: 2021-06-01
Payer: COMMERCIAL

## 2021-06-01 DIAGNOSIS — F41.1 GAD (GENERALIZED ANXIETY DISORDER): Primary | ICD-10-CM

## 2021-06-01 PROCEDURE — 90791 PSYCH DIAGNOSTIC EVALUATION: CPT | Mod: 95 | Performed by: SOCIAL WORKER

## 2021-06-01 ASSESSMENT — PATIENT HEALTH QUESTIONNAIRE - PHQ9
10. IF YOU CHECKED OFF ANY PROBLEMS, HOW DIFFICULT HAVE THESE PROBLEMS MADE IT FOR YOU TO DO YOUR WORK, TAKE CARE OF THINGS AT HOME, OR GET ALONG WITH OTHER PEOPLE: EXTREMELY DIFFICULT
SUM OF ALL RESPONSES TO PHQ QUESTIONS 1-9: 13
SUM OF ALL RESPONSES TO PHQ QUESTIONS 1-9: 13

## 2021-06-01 NOTE — TELEPHONE ENCOUNTER
Received a nurse triage message regarding patients forms. I see attachment on patient mychart message of the form did you print form and fill it out can not find completed form. Please see nurse triage message in patients chart.

## 2021-06-01 NOTE — TELEPHONE ENCOUNTER
Faxed again to  medical records and also number given to fax form to [facility] - Disability claims [phone]

## 2021-06-01 NOTE — PROGRESS NOTES
"[facility]: Integrated Behavioral Health  Provider Name:  Ede Soniflavio     Credentials:  [facility]    PATIENT'S NAME: Jeffy Silva  PREFERRED NAME: tiffany  PRONOUNS:       MRN: [id number]  : 1990  ADDRESS: [facility] [address]  [place] [address]  ACCT. NUMBER:  [id number]  DATE OF SERVICE: 21  START TIME: 800am  END TIME: *900am  PREFERRED PHONE: [phone]  May we leave a program related message: Yes  SERVICE MODALITY:  Video Visit:      Provider verified identity through the following two step process.  Patient provided:  Patient photo and Patient     Telemedicine Visit: The patient's condition can be safely assessed and treated via synchronous audio and visual telemedicine encounter.      Reason for Telemedicine Visit: Services only offered telehealth    Originating Site (Patient Location): Patient's home    Distant Site (Provider Location): Provider Remote Setting    Consent:  The patient/guardian has verbally consented to: the potential risks and benefits of telemedicine (video visit) versus in person care; bill my insurance or make self-payment for services provided; and responsibility for payment of non-covered services.     Patient would like the video invitation sent by:  Send to e-mail at: [email or web address]    Mode of Communication:  Video Conference via [facility]    As the provider I attest to compliance with applicable laws and regulations related to telemedicine.    UNIVERSAL ADULT Mental Health DIAGNOSTIC ASSESSMENT    Identifying Information:  Patient is a 30 year old, CaucasianCaucasian.  The pronoun use throughout this assessment reflects the patient's chosen pronoun.  Patient was referred for an assessment by [facility].  Patient attended the session alone.     Chief Complaint:   The reason for seeking services at this time is: \"Struggles with Depression and Anxiety\".  The problem(s) began 21.    Patient met with his primary care " "physician on May 7, 2021 and reported following concerns.  Patient was referred to the [facility].  Medication check  Started Citalopram 1.5 weeks ago   Felt like he noticed an improvement the first few days -- more motivated got some exercise and was able to focus on work  Then had disagreement with fiance and felt like he could not control his emotions, became very tearful and anxious, took hydroxyzine and fell asleep  Since that time feeling more anxious, sometimes paralyzed like cannot move but at same time restless with moving arms and legs also some warm and cold spells   Having to call into work, feeling drained, cannot focus  Did take trip to [place] to visit family  Stepping away from work was helpful, able to rest and recharge   However once home and back at work having a hard time concentrating, cannot maintain focus   Generally feeling overwhelmed  At times when anxiety is high will experience chest tightness and tingling in fingertips   Feeling easily brought to tears, thinks Citalopram has made him experience more extremes of negative emotions   Sleep has been OK but will wake up before alarm feeling anxious   Did some free sessions of counseling but those are no longer available   Denies periods of brandi or hypomania     His sister does take Prozac for mood which has been helpful  AJ admits the word \"suicide\" ran through his brain on his worst days but denies any concrete thoughts of himself dying or plan.  He is using a mindfulness/relaxation mey for his phone which also helps      Abnormal Mood Symptoms  Onset/Duration: January/February 2021  Description:   Depression (if yes, do PHQ-9): YES  Anxiety (if yes, do MATTHEW-7): YES  Accompanying Signs & Symptoms:  Still participating in activities that you used to enjoy: YES  Fatigue: YES quite often  Irritability: YES- sometimes, out of character for him  Difficulty concentrating: YES- half of the time  Changes in appetite: YES- eating a bit less  Problems with " sleep: YES- restless sleep, still tired after sleep  Heart racing/beating fast: YES- very minor  Abnormally elevated, expansive, or irritable mood: no  Persistently increased activity or energy: no  Thoughts of hurting yourself or others: no  History:  Recent stress or major life event: YES  Prior depression or anxiety: yes in 2016/2017 was on citalopram  Family history of depression or anxiety: yes - younger sister has diagnosed depression  Alcohol/drug use: YES- but alcohol use has gone way down to just 2-3 drinks per week  Difficulty sleeping: falls asleep ok   Precipitating or alleviating factors: None  Therapies tried and outcome: lifestyle changes and yoga, exercising more gym/biking.     Has cut down to 2-3 alcoholic drinks per week   Increasing his activity level   New job is very taxing and demanding   Emotions all hitting him at once after getting COVID vaccine  After first pfizer vaccine dose caused some physical fatigue and tiredness which seems to brought on depression, sadness, anxiety   Has been seeing therapist which has been helping   Some days feeling so tired fatigued and down, randomly crying some days, hard to concentrate    Feels like impacting work -- his boss has been very supportive   Had similar period of this in [id number]   Took citalopram at that time which seemed to help, did gain some weight but helped him get back on track   Denies SI no plan     Today    Patient is a pleasant 30-year-old male who recently moved from [place] to [place] during the COVID 19 pandemic.  Patient reports this further caused isolation for him.  Furthermore, patient reports 3 months ago he received the vaccine and experienced fatigue and low energy which she feels exacerbated an underlying mood and anxiety issue.  Patient reports she reached out to his PCP 2 months ago.  Please see above notes.    Patient reports his underlying issue is anxiety which he had first experienced after college.  Patient  "reports his anxiety is usually triggered by work.  Patient reports he is constantly worrying about his job performance, will think of worst-case scenarios and will often check and spend additional hours to ensure he does not make a mistake.  Patient is working between 50 to 60 hours a week.  Patient reports she is fearful of disapproval from supervisors.  Patient reports that his supervisor is always had positive evaluations of him.  \"This is more within my head\".  Patient adds that his coworkers,  \"over thinks everything\".  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  Patient adds it is hard to get out of his \"analytical thinking\".  Patient adds that in his new job,  all of his work is reviewed by his manager with increases his performance anxiety.    Patient denies a history of social anxiety, obsessive-compulsive disorder, panic attack or trauma.    Patient reports that due to his fears he is working 50 to 60 hours a week which takes away time on weekends and evenings.  Patient reports he is then exhausted.  Patient reports this addss tension is relationship with his fiancée in addition to not having balance in his life.  Patient reports in [place] before COVID, he had balance with seeing his friends, family, biking and doing other outside activities.  Patient reports since the move and COVID-19, he has few social outlets.  In addition, patient reports working remotely he has not had the opportunity to develop friendships at work.  Patient reports both the and his alberto families live in [place].  Patient reports he moved to [place] as his fiancée had a job at the [employer] as a nurse practitioner.  Patient feels this attributes to his depressed mood.  Patient is also questioning if this is a type of job that he wants to have as a career.    Patient reports that he is reviewing online tools to manage his anxiety.  He reports he " "recently purchased a book called \"DARE\" method.  Directed patient to self-help guide.co.UK website and encouraged patient to review cognitive behavioral therapy interventions in comparison to acceptance and commitment therapy and mindfulness.  Counseled patient that due to his analytical brain he may respond better to mindfulness interventions rather than cognitive behavioral therapy.  Patient to review this for next session.      Patient has attempted to resolve these concerns in the past through Patient reports he had a similar episode in 2016 at which time he took citalopram.  Patient did not meet with a therapist..    Social/Family History:  Patient reported they grew up in other [place], [place].  They were raised by biological parents  .  Parents seperated / .  Patient reported that their childhood was ok.  Patient described their current relationships with family of origin as good.      The patient describes their cultural background as .  Cultural influences and impact on patient's life structure, values, norms, and healthcare: I grew up in an upper middle class sub[place] neighborhood to an English father and a first generation Serbian-American mother. My childhood included international travel and learning about other cultures. While my mother raised my siblings and I as Jainism, our father was agnostic and later became Athiest which created some tension between our family and my parent's marriage. Since 19, I no longer affiliate with a Congregation..  Contextual influences on patient's health include: Societal Factors Recent move, lack of support system, new job..    These factors will be addressed in the Preliminary Treatment plan. Patient identified their preferred language to be English. Patient reported they does not need the assistance of an  or other support involved in therapy.     Patient reported had no significant delays in developmental tasks.   Patient's highest education " level was college graduate  .  Patient identified the following learning problems: none reported.  Modifications will not be used to assist communication in therapy.  Patient reports they are  able to understand written materials.    Patient reported the following relationship history lives with shabbir in [place].  Patient's current relationship status is partner or significant other for 2 years.   Patient identified their sexual orientation as heterosexual.  Patient reported having   child(emiliano). Patient identified partner;parents;mother;siblings;friends as part of their support system.  Patient identified the quality of these relationships as good,  .      Patient's current living/housing situation involves staying in own home/apartment.  They live with Nica Pratt Fiance  and they report that housing is stable     Patient is currently on medical leave.  Patient reports their finances are obtained through employment;partner. Patient does not identify finances as a current stressor.      Patient reported that they have been involved with the legal system.  I was charged with a DWI-related incident in my final year of college. I fought my case and accepted a traffic-offense plea deal. I did not serve any [facility] time apart from the night of my arrest. . Patient does not report being under probation/ parole/ jurisdiction. They are not under any current court jurisdiction. .      Patient's Strengths and Limitations:  Patient identified the following strengths or resources that will help them succeed in treatment: . Things that may interfere with the patient's success in treatment include: few friends, lack of family support and lack of social support.     Personal and Family Medical History:  Patient does report a family history of mental health concerns.  Patient reports family history includes Hyperlipidemia in his maternal grandmother..     Patient does report Mental Health Diagnosis and/or  Treatment.  Patient Patient reported the following previous diagnoses which include(s): an Anxiety Disorder and Depression.  Patient reported symptoms began 2016.   Patient has received mental health services in the past: primary care provider at [place]..  Psychiatric Hospitalizations: None.  Patient denies a history of civil commitment.  Currently, patient is receiving other mental health services.  These include primary care provider at [place].  For follow-up on June 16.           Patient has had a physical exam to rule out medical causes for current symptoms.  Date of last physical exam was within the past year. Client was encouraged to follow up with PCP if symptoms were to develop. The patient has a [facility] Provider, who is named Nehemiah Wilder.  Patient reports the following current medical concerns: See above.  Patient denies any issues with pain..   There are not significant appetite / nutritional concerns / weight changes.   Patient does not report a history of head injury / trauma / cognitive impairment.      Patient reports current meds as:   No outpatient medications have been marked as taking for the 6/1/21 encounter (Virtual Visit) with Ede Mccall LICSW.       Medication Adherence:  Patient reports taking. taking prescribed medications as prescribed.    Patient Allergies:    Allergies   Allergen Reactions     Bee Venom Anaphylaxis     Wasp Venom Protein Anaphylaxis       Medical History:    Past Medical History:   Diagnosis Date     Depressive disorder October 2016    Mild/Mod Depression and Anxiety in 2016/2017     Hypertension October 2014    Has recently been brought down to normal levels         Current Mental Status Exam:   Appearance:  Appropriate    Eye Contact:  Good   Psychomotor:  Normal       Gait / station:  no problem  Attitude / Demeanor: Cooperative   Speech      Rate / Production: Normal/ Responsive      Volume:  Normal  volume       Language:  intact  Mood:   Depressed   Affect:   Flat    Thought Content: Clear   Thought Process: Coherent       Associations: No loosening of associations  Insight:   Good   Judgment:  Intact   Orientation:  All  Attention/concentration: Good    Rating Scales:    PHQ9:    PHQ-9 SCORE 5/19/2021 5/28/2021 6/1/2021   PHQ-9 Total Score MyChart - 14 (Moderate depression) 13 (Moderate depression)   PHQ-9 Total Score 19 14 13       GAD7:    MATTHEW-7 SCORE 5/7/2021 5/19/2021 5/28/2021   Total Score - - 10 (moderate anxiety)   Total Score 11 13 10     CGI:     First:No data recorded    Most recentNo data recorded    Substance Use:  Patient did not report a family history of substance use concerns; see medical history section for details.  Patient has not received chemical dependency treatment in the past.  Patient has not ever been to detox.      Patient is not currently receiving any chemical dependency treatment.           Substance History of use Age of first use Date of last use     Pattern and duration of use (include amounts and frequency)   Alcohol currently use   13 05/31/21 REPORTS SUBSTANCE USE: N/A   Cannabis   used in the past 19 01/01/15 REPORTS SUBSTANCE USE: N/A     Amphetamines   used in the past   01/01/14 REPORTS SUBSTANCE USE: N/A   Cocaine/crack    never used       REPORTS SUBSTANCE USE: N/A   Hallucinogens used in the past   21 01/01/13  REPORTS SUBSTANCE USE: N/A   Inhalants never used         REPORTS SUBSTANCE USE: N/A   Heroin never used         REPORTS SUBSTANCE USE: N/A   Other Opiates never used     REPORTS SUBSTANCE USE: N/A   Benzodiazepine   used in the past 20 01/01/11 REPORTS SUBSTANCE USE: N/A   Barbiturates never used     REPORTS SUBSTANCE USE: N/A   Over the counter meds never used     REPORTS SUBSTANCE USE: N/A   Caffeine currently use 20   REPORTS SUBSTANCE USE: N/A   Nicotine  never used     REPORTS SUBSTANCE USE: N/A   Other substances not listed above:  Identify:  never used      REPORTS SUBSTANCE USE: N/A     Patient reported the following problems as a result of their substance use: no problems, not applicable.     CAGE- AID:    CAGE-AID Total Score 6/1/2021   Total Score 3   Total Score MyChart 3 (A total score of 2 or greater is considered clinically significant)       Substance Use: substance use at school and driving under the influence    Based on the positive CAGE score and clinical interview there  are indications of drug or alcohol abuse. Continue to monitor..    Significant Losses / Trauma / Abuse / Neglect Issues:   Patient did not  serve in the .  There are indications or report of significant loss, trauma, abuse or neglect issues related to: are no indications and client denies any losses, trauma, abuse, or neglect concerns.  Concerns for possible neglect are not present.     Safety Assessment:   Current Safety Concerns:  [place] Suicide Severity Rating Scale (Lifetime/Recent)  [place] Suicide Severity Rating (Lifetime/Recent) 6/3/2021   1. Wish to be Dead (Lifetime) Yes   1. Wish to be Dead (Recent) Yes   2. Non-Specific Active Suicidal Thoughts (Lifetime) No   2. Non-Specific Active Suicidal Thoughts (Recent) No   3. Active Suicidal Ideation with any Methods (Not Plan) Without Intent to Act (Lifetime) No   3. Active Suicidal Ideation with any Methods (Not Plan) Without Intent to Act (Recent) No   4. Active Suicidal Ideation with Some Intent to Act, Without Specific Plan (Lifetime) No   4. Active Suicidal Ideation with Some Intent to Act, Without Specific Plan (Recent) No   5. Active Suicidal Ideation with Specific Plan and Intent (Lifetime) No   5. Active Suicidal Ideation with Specific Plan and Intent (Recent) No   Most Severe Ideation Rating (Lifetime) NA   Frequency (Lifetime) NA   Duration (Lifetime) NA   Controllability (Lifetime) NA   Protective Factors  (Lifetime) NA   Reasons for Ideation (Lifetime) NA   Most Severe Ideation Rating (Past Month) NA    Frequency (Past Month) NA   Duration (Past Month) NA   Controllability (Past Month) NA   Protective Factors (Past Month) NA   Reasons for Ideation (Past Month) NA   Actual Attempt (Lifetime) No   Actual Attempt (Past 3 Months) No   Has subject engaged in non-suicidal self-injurious behavior? (Lifetime) No   Has subject engaged in non-suicidal self-injurious behavior? (Past 3 Months) No   Interrupted Attempts (Lifetime) No   Interrupted Attempts (Past 3 Months) No   Aborted or Self-Interrupted Attempt (Lifetime) No   Aborted or Self-Interrupted Attempt (Past 3 Months) No   Preparatory Acts or Behavior (Lifetime) No   Preparatory Acts or Behavior (Past 3 Months) No   Most Lethal Attempt Actual Lethality Code NA   Initial/First Attempt Actual Lethality Code NA     Patient clarified that he is not have suicidal thoughts even though he completed PHQ 9 and indicated a 1.  Patient states this reflects more feeling hopeless and worthless.  Patient denies any history of actual thoughts of wanting to die, plans of hurting himself or any previous attempts.  Resources were provided to the patient.  Patient declined the need to follow-up with safety  plan.    Patient denies current homicidal ideation and behaviors.  Patient denies current self-injurious ideation and behaviors.    Patient denied risk behaviors associated with substance use.  Patient denies any high risk behaviors associated with mental health symptoms.  Patient reports the following current concerns for their personal safety: None.  Patient reports there are not firearms in the house.       The firearms are secured in a locked space.    History of Safety Concerns:  Patient denied a history of homicidal ideation.     Patient denied a history of personal safety concerns.    Patient denied a history of assaultive behaviors.    Patient denied a history of sexual assault behaviors.     Patient denied a history of risk behaviors associated with substance  use.  Patient denies any history of high risk behaviors associated with mental health symptoms.  Patient reports the following protective factors: dedication to family or friends;safe and stable environment;regular sleep;regular physical activity;sense of belonging;purpose;abstinence from substances;living with other people;structured day;effective problem solving skills;commitment to well being;sense of meaning;financial stability    Risk Plan:  See Recommendations for Safety and Risk Management Plan    Review of Symptoms per patient report:  Depression: Change in sleep, Lack of interest, Change in energy level, Difficulties concentrating, Psychomotor slowing or agitation, Feelings of hopelessness, Feelings of helplessness, Low self-worth and Feeling sad, down, or depressed  Katherine:  No Symptoms  Psychosis: No Symptoms  Anxiety: Excessive worry, Psychomotor agitation, Poor concentration and Irritability  Panic:  Palpitations  Post Traumatic Stress Disorder:  No Symptoms   Eating Disorder: No Symptoms  ADD / ADHD:  No symptoms  Conduct Disorder: No symptoms  Autism Spectrum Disorder: No symptoms  Obsessive Compulsive Disorder: No Symptoms    Patient reports the following compulsive behaviors and treatment history: None reported.      Diagnostic Criteria:   A. Excessive anxiety and worry about a number of events or activities (such as work or school performance).   C. Select 3 or more symptoms (required for diagnosis). Only one item is required in children.   - Restlessness or feeling keyed up or on edge.    - Being easily fatigued.    - Difficulty concentrating or mind going blank.    - Sleep disturbance (difficulty falling or staying asleep, or restless unsatisfying sleep).     Functional Status:  Patient reports the following functional impairments: academic performance, relationship(s), self-care and social interactions.     WHODAS:   WHODAS 2.0 Total Score 6/1/2021   Total Score 27   Total Score [facility] 27      Nonprogrammatic care:  Patient is requesting basic services to address current mental health concerns.    Clinical Summary:  1. Reason for assessment: Struggle with depression and anxiety.  2. Psychosocial, Cultural and Contextual Factors:   Recent move, lack of support system, new job   3. Principal DSM5 Diagnoses  (Sustained by DSM5 Criteria Listed Above):   300.02 (F41.1) Generalized Anxiety Disorder.  4. Other Diagnoses that is relevant to services:   296.31 (F33.0) Major Depressive Disorder, Recurrent Episode, Mild _ and With anxious distress.  5. Provisional Diagnosis:   as evidenced by none   6. Prognosis: Expect Improvement.  7. Likely consequences of symptoms if not treated: Increased anxiety and depressed mood.  8. Client strengths include:  committed to sobriety, educated, employed, goal-focused, good listener, insightful, intelligent, motivated, open to learning, wants to learn, willing to ask questions and willing to relate to others .     Recommendations:     1. Plan for Safety and Risk Management:   Recommended that patient call 911 or go to the local ED should there be a change in any of these risk factors..          Report to child / adult protection services was NA.     2. Patient's identified No concerns.     3. Initial Treatment will focus on:    Anxiety - Patient was provided resources from self-help care.UK.     4. Resources/Service Plan:    services are not indicated.   Modifications to assist communication are not indicated.   Additional disability accommodations are not indicated.      5. Collaboration:   Collaboration / coordination of treatment will be initiated with the following  support professionals: primary care physician.      6.  Referrals:   The following referral(s) will be initiated: None. Next Scheduled Appointment: .     A Release of Information has been obtained for the following: None.    7. ARMANDO:    ARMANDO:  Discussed the general effects of drugs and alcohol on  health and well-being. Provider gave patient printed information about the effects of chemical use on their health and well being. Recommendations: None continue to monitor.     8. Records:   These were reviewed at time of assessment.   Information in this assessment was obtained from the medical record and  provided by patient who is a good historian.    Patient will have open access to their mental health medical record.      Provider Name/ Credentials:  brigida Garrison, [facility]  Lynn 3, 2021

## 2021-06-02 ENCOUNTER — MYC MEDICAL ADVICE (OUTPATIENT)
Dept: FAMILY MEDICINE | Facility: CLINIC | Age: 31
End: 2021-06-02

## 2021-06-02 ASSESSMENT — PATIENT HEALTH QUESTIONNAIRE - PHQ9: SUM OF ALL RESPONSES TO PHQ QUESTIONS 1-9: 13

## 2021-06-02 NOTE — TELEPHONE ENCOUNTER
msg sent to [pt  im guessing this was an old message that CK got.  Await response.   Thanks!     Lena Oden RN

## 2021-06-02 NOTE — TELEPHONE ENCOUNTER
Please call AJ to see if there is still paperwork missing. We did complete paperwork at his recent visit. Thank you.

## 2021-06-03 ASSESSMENT — COLUMBIA-SUICIDE SEVERITY RATING SCALE - C-SSRS
2. HAVE YOU ACTUALLY HAD ANY THOUGHTS OF KILLING YOURSELF LIFETIME?: NO
5. HAVE YOU STARTED TO WORK OUT OR WORKED OUT THE DETAILS OF HOW TO KILL YOURSELF? DO YOU INTEND TO CARRY OUT THIS PLAN?: NO
ATTEMPT PAST THREE MONTHS: NO
4. HAVE YOU HAD THESE THOUGHTS AND HAD SOME INTENTION OF ACTING ON THEM?: NO
5. HAVE YOU STARTED TO WORK OUT OR WORKED OUT THE DETAILS OF HOW TO KILL YOURSELF? DO YOU INTEND TO CARRY OUT THIS PLAN?: NO
6. HAVE YOU EVER DONE ANYTHING, STARTED TO DO ANYTHING, OR PREPARED TO DO ANYTHING TO END YOUR LIFE?: NO
TOTAL  NUMBER OF ABORTED OR SELF INTERRUPTED ATTEMPTS PAST 3 MONTHS: NO
TOTAL  NUMBER OF ABORTED OR SELF INTERRUPTED ATTEMPTS PAST LIFETIME: NO
1. IN THE PAST MONTH, HAVE YOU WISHED YOU WERE DEAD OR WISHED YOU COULD GO TO SLEEP AND NOT WAKE UP?: YES
1. IN THE PAST MONTH, HAVE YOU WISHED YOU WERE DEAD OR WISHED YOU COULD GO TO SLEEP AND NOT WAKE UP?: YES
TOTAL  NUMBER OF INTERRUPTED ATTEMPTS LIFETIME: NO
ATTEMPT LIFETIME: NO
TOTAL  NUMBER OF INTERRUPTED ATTEMPTS PAST 3 MONTHS: NO
2. HAVE YOU ACTUALLY HAD ANY THOUGHTS OF KILLING YOURSELF?: NO
6. HAVE YOU EVER DONE ANYTHING, STARTED TO DO ANYTHING, OR PREPARED TO DO ANYTHING TO END YOUR LIFE?: NO
4. HAVE YOU HAD THESE THOUGHTS AND HAD SOME INTENTION OF ACTING ON THEM?: NO
3. HAVE YOU BEEN THINKING ABOUT HOW YOU MIGHT KILL YOURSELF?: NO

## 2021-06-16 ASSESSMENT — ANXIETY QUESTIONNAIRES
7. FEELING AFRAID AS IF SOMETHING AWFUL MIGHT HAPPEN: SEVERAL DAYS
6. BECOMING EASILY ANNOYED OR IRRITABLE: NOT AT ALL
5. BEING SO RESTLESS THAT IT IS HARD TO SIT STILL: NOT AT ALL
1. FEELING NERVOUS, ANXIOUS, OR ON EDGE: MORE THAN HALF THE DAYS
4. TROUBLE RELAXING: SEVERAL DAYS
2. NOT BEING ABLE TO STOP OR CONTROL WORRYING: SEVERAL DAYS
GAD7 TOTAL SCORE: 7
3. WORRYING TOO MUCH ABOUT DIFFERENT THINGS: MORE THAN HALF THE DAYS
GAD7 TOTAL SCORE: 7
GAD7 TOTAL SCORE: 7
7. FEELING AFRAID AS IF SOMETHING AWFUL MIGHT HAPPEN: SEVERAL DAYS

## 2021-06-16 ASSESSMENT — PATIENT HEALTH QUESTIONNAIRE - PHQ9
SUM OF ALL RESPONSES TO PHQ QUESTIONS 1-9: 11
SUM OF ALL RESPONSES TO PHQ QUESTIONS 1-9: 11
10. IF YOU CHECKED OFF ANY PROBLEMS, HOW DIFFICULT HAVE THESE PROBLEMS MADE IT FOR YOU TO DO YOUR WORK, TAKE CARE OF THINGS AT HOME, OR GET ALONG WITH OTHER PEOPLE: VERY DIFFICULT

## 2021-06-17 ENCOUNTER — VIRTUAL VISIT (OUTPATIENT)
Dept: BEHAVIORAL HEALTH | Facility: CLINIC | Age: 31
End: 2021-06-17
Payer: COMMERCIAL

## 2021-06-17 DIAGNOSIS — F41.1 GAD (GENERALIZED ANXIETY DISORDER): Primary | ICD-10-CM

## 2021-06-17 PROCEDURE — 90837 PSYTX W PT 60 MINUTES: CPT | Mod: GT | Performed by: SOCIAL WORKER

## 2021-06-17 ASSESSMENT — ANXIETY QUESTIONNAIRES: GAD7 TOTAL SCORE: 7

## 2021-06-17 ASSESSMENT — PATIENT HEALTH QUESTIONNAIRE - PHQ9: SUM OF ALL RESPONSES TO PHQ QUESTIONS 1-9: 11
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
## 2021-06-18 ENCOUNTER — OFFICE VISIT (OUTPATIENT)
Dept: FAMILY MEDICINE | Facility: CLINIC | Age: 31
End: 2021-06-18
Payer: COMMERCIAL

## 2021-06-18 VITALS
RESPIRATION RATE: 16 BRPM | OXYGEN SATURATION: 98 % | HEART RATE: 83 BPM | DIASTOLIC BLOOD PRESSURE: 80 MMHG | WEIGHT: 182.6 LBS | BODY MASS INDEX: 26.2 KG/M2 | TEMPERATURE: 98.1 F | SYSTOLIC BLOOD PRESSURE: 139 MMHG

## 2021-06-18 DIAGNOSIS — F43.23 ADJUSTMENT DISORDER WITH MIXED ANXIETY AND DEPRESSED MOOD: Primary | ICD-10-CM

## 2021-06-18 PROCEDURE — 99213 OFFICE O/P EST LOW 20 MIN: CPT | Performed by: PHYSICIAN ASSISTANT

## 2021-06-18 NOTE — PROGRESS NOTES
Answers for HPI/ROS submitted by the patient on 6/16/2021   If you checked off any problems, how difficult have these problems made it for you to do your work, take care of things at home, or get along with other people?: Very difficult  PHQ9 TOTAL SCORE: 11  MATTHEW 7 TOTAL SCORE: 7      Assessment & Plan     Adjustment disorder with mixed anxiety and depressed mood  Improving but still with moderate depressive symptoms. Currently taking fluoxetine 20 mg daily and following with therapist. Both the fluoxetine and therapy sessions have been helpful. Will increase dose of fluoxetine to 40 mg daily. Paperwork for return to work completed. We agreed to push out return to work date to 7/29/21 as he is making progress but still with room for improvement. Employer has been supportive. Continue to work on regular exercise, maintaining a schedule, and outside mental health practices. We will follow up again in about 1 month or sooner if new or worsening symptoms.  - FLUoxetine (PROZAC) 20 MG capsule; Take 2 capsules (40 mg) by mouth daily     Depression Screening Follow Up    PHQ 6/16/2021   PHQ-9 Total Score 11   Q9: Thoughts of better off dead/self-harm past 2 weeks Several days   F/U: Thoughts of suicide or self-harm No   F/U: Safety concerns No     Follow Up      Follow Up Actions Taken  Crisis resource information provided in the After Visit Summary  Referred patient back to mental health provider    Discussed the following ways the patient can remain in a safe environment:  remove alcohol and be around others    Return in 1 month (on 7/21/2021) for medication check.    Nehemiah Wilder PA-C  [facility]   AJ is a 30 year old who presents for the following health issues     HPI     Medication Followup of Fluoxetine    Taking Medication as prescribed: yes    Side Effects:  Tired/lack of energy - hit or miss    Medication Helping Symptoms:  yes   Feeling better   Noticing his PHQ scores  are lower  Past couple weeks still some off days with moodiness and crying but past week has been really good   Has been following with Kalinan[facility] Talaver[facility] FAJARDO which has been helpful   Side effect noticing is still some fatigue and drowsiness -- never had to take naps in the past but will take some naps now  Needing to take either melatonin or hydroxyzine at nighttime to help fall asleep. Generally feeling more rested.   Exercising been trying to do more stuff outside playing frisbee golf   Had been taking fluoxetine at nighttime but switched to morning which has helped  Occasional passive SI, no plan   Planning to return to work 7/29/21    Review of Systems   Constitutional, HEENT, cardiovascular, pulmonary, gi and gu systems are negative, except as otherwise noted.      Objective    /80 (BP Location: Left arm, Patient Position: Sitting, Cuff Size: Adult Regular)   Pulse 83   Temp 98.1  F (36.7  C) (Tympanic)   Resp 16   Wt 82.8 kg (182 lb 9.6 oz)   SpO2 98%   BMI 26.20 kg/m    Body mass index is 26.2 kg/m .  Physical Exam  Vitals signs and nursing note reviewed.   Constitutional:       Appearance: Normal appearance.   HENT:      Head: Normocephalic and atraumatic.   Pulmonary:      Effort: Pulmonary effort is normal.   Neurological:      General: No focal deficit present.      Mental Status: He is alert and oriented to person, place, and time. Mental status is at baseline.   Psychiatric:         Mood and Affect: Mood normal.         Behavior: Behavior normal.

## 2021-07-01 NOTE — TELEPHONE ENCOUNTER
This document for Disability  for Jeffy Silva, was faxed to the [phone] on 6/29/21 by NAE [place]. Placed in Dr. Wilder faxed forms folder.     Shahnaz Patterson on 7/1/2021 at 9:12 AM

## 2021-07-06 ENCOUNTER — TELEPHONE (OUTPATIENT)
Dept: FAMILY MEDICINE | Facility: CLINIC | Age: 31
End: 2021-07-06

## 2021-07-06 NOTE — TELEPHONE ENCOUNTER
Forms were received from [employer] ( short term disability). Placed in [employer] form folder In [facility].    Gilma Marcelino MA

## 2021-07-15 ENCOUNTER — VIRTUAL VISIT (OUTPATIENT)
Dept: BEHAVIORAL HEALTH | Facility: CLINIC | Age: 31
End: 2021-07-15
Payer: COMMERCIAL

## 2021-07-15 DIAGNOSIS — F41.1 GAD (GENERALIZED ANXIETY DISORDER): Primary | ICD-10-CM

## 2021-07-15 PROCEDURE — 90837 PSYTX W PT 60 MINUTES: CPT | Mod: GT | Performed by: SOCIAL WORKER
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
## 2021-07-19 ENCOUNTER — MEDICAL CORRESPONDENCE (OUTPATIENT)
Dept: HEALTH INFORMATION MANAGEMENT | Facility: CLINIC | Age: 31
End: 2021-07-19

## 2021-07-19 ASSESSMENT — ANXIETY QUESTIONNAIRES
1. FEELING NERVOUS, ANXIOUS, OR ON EDGE: SEVERAL DAYS
8. IF YOU CHECKED OFF ANY PROBLEMS, HOW DIFFICULT HAVE THESE MADE IT FOR YOU TO DO YOUR WORK, TAKE CARE OF THINGS AT HOME, OR GET ALONG WITH OTHER PEOPLE?: SOMEWHAT DIFFICULT
3. WORRYING TOO MUCH ABOUT DIFFERENT THINGS: NOT AT ALL
GAD7 TOTAL SCORE: 4
4. TROUBLE RELAXING: NOT AT ALL
5. BEING SO RESTLESS THAT IT IS HARD TO SIT STILL: SEVERAL DAYS
6. BECOMING EASILY ANNOYED OR IRRITABLE: SEVERAL DAYS
7. FEELING AFRAID AS IF SOMETHING AWFUL MIGHT HAPPEN: SEVERAL DAYS
2. NOT BEING ABLE TO STOP OR CONTROL WORRYING: NOT AT ALL
GAD7 TOTAL SCORE: 4
7. FEELING AFRAID AS IF SOMETHING AWFUL MIGHT HAPPEN: SEVERAL DAYS
GAD7 TOTAL SCORE: 4

## 2021-07-19 ASSESSMENT — PATIENT HEALTH QUESTIONNAIRE - PHQ9
10. IF YOU CHECKED OFF ANY PROBLEMS, HOW DIFFICULT HAVE THESE PROBLEMS MADE IT FOR YOU TO DO YOUR WORK, TAKE CARE OF THINGS AT HOME, OR GET ALONG WITH OTHER PEOPLE: SOMEWHAT DIFFICULT
SUM OF ALL RESPONSES TO PHQ QUESTIONS 1-9: 5
SUM OF ALL RESPONSES TO PHQ QUESTIONS 1-9: 5

## 2021-07-20 ASSESSMENT — ANXIETY QUESTIONNAIRES: GAD7 TOTAL SCORE: 4

## 2021-07-20 ASSESSMENT — PATIENT HEALTH QUESTIONNAIRE - PHQ9: SUM OF ALL RESPONSES TO PHQ QUESTIONS 1-9: 5

## 2021-07-21 ENCOUNTER — OFFICE VISIT (OUTPATIENT)
Dept: FAMILY MEDICINE | Facility: CLINIC | Age: 31
End: 2021-07-21
Payer: COMMERCIAL

## 2021-07-21 VITALS
DIASTOLIC BLOOD PRESSURE: 83 MMHG | TEMPERATURE: 97.8 F | RESPIRATION RATE: 18 BRPM | HEART RATE: 83 BPM | WEIGHT: 180 LBS | BODY MASS INDEX: 25.83 KG/M2 | SYSTOLIC BLOOD PRESSURE: 128 MMHG | OXYGEN SATURATION: 99 %

## 2021-07-21 DIAGNOSIS — F43.23 ADJUSTMENT DISORDER WITH MIXED ANXIETY AND DEPRESSED MOOD: Primary | ICD-10-CM

## 2021-07-21 PROCEDURE — 99213 OFFICE O/P EST LOW 20 MIN: CPT | Performed by: PHYSICIAN ASSISTANT

## 2021-07-21 RX ORDER — FLUOXETINE 10 MG/1
10 CAPSULE ORAL DAILY
Qty: 30 CAPSULE | Refills: 0 | Status: SHIPPED | OUTPATIENT
Start: 2021-07-21 | End: 2022-01-25

## 2021-07-21 RX ORDER — LORAZEPAM 0.5 MG/1
0.5 TABLET ORAL EVERY 6 HOURS PRN
Qty: 10 TABLET | Refills: 2 | Status: SHIPPED | OUTPATIENT
Start: 2021-07-21 | End: 2022-07-11

## 2021-07-21 NOTE — LETTER
[facility]  [address] [place] [address]  [phone]      July 21, 2021    RE:  Jeffy RIOS Ricardo                                                                                                                                                       [address] [place] [address]            To whom it may concern:    Jeffy Silva is under my professional care and is cleared to return to work Thursday, July 29th, 2021.           Sincerely,        Nehemiah Wilder PA-C    [facility]

## 2021-07-21 NOTE — PROGRESS NOTES
Assessment & Plan     Adjustment disorder with mixed anxiety and depressed mood  Significantly improved. Has been following with counselor Kalin FAJARDO which has been helpful. Stable on current medications Prozac 40 mg daily and PRN ativan (last fill May 2021 #10 tabs). I did send in 10 mg Prozac capsules today for future when RICHI anticipates weaning off the medication. We discussed this tapering process but for now planning to continue on Prozac 40 mg daily (taking two of the 20 mg capsules). Planning to return to work 7/29/21. Return to work note completed.   - LORazepam (ATIVAN) 0.5 MG tablet; Take 1 tablet (0.5 mg) by mouth every 6 hours as needed for anxiety  - FLUoxetine (PROZAC) 10 MG capsule; Take 1 capsule (10 mg) by mouth daily    Return for if not improving or worsening.    HONEY Rued[facility]    Subjective   RICHI is a 30 year old who presents for the following health issues     HPI     Answers for HPI/ROS submitted by the patient on 7/19/2021  If you checked off any problems, how difficult have these problems made it for you to do your work, take care of things at home, or get along with other people?: Somewhat difficult  PHQ9 TOTAL SCORE: 5  MATTHEW 7 TOTAL SCORE: 4    Med check - fluoxetine   - no side effects  - taking 40 mg daily  - time away from work has really helped with mood and helping to clarify long term plans/goals   - continues to follow with therapist   - recently adopted two new dogs, have three total now, helping with getting out for exercise, has been a positive change   - planning to return to work later this month     Med check - ativan   - taking PRN   - last fill in May 2021 #10   - would like to have on hand if needed in the future       Review of Systems   Constitutional, HEENT, cardiovascular, pulmonary, gi and gu systems are negative, except as otherwise noted.      Objective    /83 (BP Location: Left arm, Patient Position:  Sitting, Cuff Size: Adult Regular)   Pulse 83   Temp 97.8  F (36.6  C) (Tympanic)   Resp 18   Wt 81.6 kg (180 lb)   SpO2 99%   BMI 25.83 kg/m    Body mass index is 25.83 kg/m .  Physical Exam  Vitals and nursing note reviewed.   Constitutional:       Appearance: Normal appearance.   Pulmonary:      Effort: Pulmonary effort is normal.   Neurological:      General: No focal deficit present.      Mental Status: He is alert and oriented to person, place, and time.   Psychiatric:         Mood and Affect: Mood normal.         Behavior: Behavior normal.

## 2021-08-03 NOTE — TELEPHONE ENCOUNTER
Sorted through faxed in POD C SORT basket  Fax dated 7/22/2021 from [employer]     [[employer] [facility] is responsible for managing claims for Short-Term Disability (STD) benefits under [employer]'s Group Disability Plan. We are writing in reference to Jeffy Silva's claim for STD benefits under the plan.    Please find the enclosed request(s) for information previously sent to Dr. Wilder. This is the second request for this information. Further delay may result in an adverse claim determination. Please submit the requested information by July 29, 2021.]          I printed office visit notes from Lynn 15, 2021 and faxed     Em Thacker MA

## 2021-08-19 ENCOUNTER — MYC MEDICAL ADVICE (OUTPATIENT)
Dept: FAMILY MEDICINE | Facility: CLINIC | Age: 31
End: 2021-08-19

## 2021-08-31 DIAGNOSIS — F43.23 ADJUSTMENT DISORDER WITH MIXED ANXIETY AND DEPRESSED MOOD: ICD-10-CM

## 2021-08-31 NOTE — TELEPHONE ENCOUNTER
Refill request    Patient asking for remaining refills [facility]zac [facility]    patient needs done today as his insurance changes tomorrow    Brigette Nolasco RN  [facility] Nurse Advisor

## 2021-10-11 ENCOUNTER — HEALTH MAINTENANCE LETTER (OUTPATIENT)
Age: 31
End: 2021-10-11

## 2022-01-16 ENCOUNTER — OFFICE VISIT (OUTPATIENT)
Dept: URGENT CARE | Facility: URGENT CARE | Age: 32
End: 2022-01-16
Payer: COMMERCIAL

## 2022-01-16 VITALS
SYSTOLIC BLOOD PRESSURE: 124 MMHG | BODY MASS INDEX: 28 KG/M2 | TEMPERATURE: 98.4 F | HEIGHT: 71 IN | OXYGEN SATURATION: 98 % | DIASTOLIC BLOOD PRESSURE: 71 MMHG | HEART RATE: 78 BPM | WEIGHT: 200 LBS

## 2022-01-16 DIAGNOSIS — S61.213A LACERATION OF LEFT MIDDLE FINGER WITHOUT FOREIGN BODY WITHOUT DAMAGE TO NAIL, INITIAL ENCOUNTER: Primary | ICD-10-CM

## 2022-01-16 PROCEDURE — 12001 RPR S/N/AX/GEN/TRNK 2.5CM/<: CPT | Performed by: INTERNAL MEDICINE

## 2022-01-16 ASSESSMENT — MIFFLIN-ST. JEOR: SCORE: 1889.32

## 2022-01-16 NOTE — PATIENT INSTRUCTIONS
Patient Education     Laceration of an Arm or Leg: Stitches, Staples, or Tape   A laceration is a cut through the skin. If it's deep or it's gaping open, it may require stitches or staples to close so it can heal. Minor cuts may be treated with surgical tape closures, or skin glue.   X-rays may be done if something may have entered the skin through the cut. You may also need a tetanus shot if you are not up to date on this vaccine.   Home care    Follow the healthcare provider s instructions on how to care for the cut.    Wash your hands with soap and clean, running water before and after caring for your wound. This is to help prevent infection.    Keep the wound clean and dry. If a bandage was applied and it becomes wet or dirty, replace it. Otherwise, leave it in place for the first 24 hours, then change it once a day or as directed.    If stitches or staples were used, clean the wound daily:  ? After removing the bandage, wash the area with soap and water. Use a wet cotton swab to loosen and remove any blood or crust that forms.  ? After cleaning, keep the wound clean and dry. Talk with your healthcare provider before putting any antibiotic ointment on the wound. Reapply the bandage.    Remove the bandage to shower as usual after the first 24 hours, but don't soak the area in water (no swimming) until the stitches or staples are removed.    If surgical tape closures were used, keep the area clean and dry. If it becomes wet, blot it dry with a towel. Let the surgical tape fall off on its own.    Follow the healthcare provider's instructions for any medicines prescribed.  ? The provider may prescribe an antibiotic cream or ointment to prevent infection. He or she may also prescribe an antibiotic pill. Don't stop taking this medicine until you have finished it all or the provider tells you to stop.  ? The provider may also prescribe medicine for pain. Follow the instructions exactly for how to take these  medicines.    Don't do activities that may reopen your wound.    Follow-up care  Follow up with your healthcare provider, or as advised. Most skin wounds heal within 10 days. But an infection may sometimes occur even with proper treatment. Check the wound daily for the signs of infection listed below. Stitches and staples should be removed within 7 to14 days. If surgical tape closures were used, you may remove them after 10 days if they have not fallen off by then.    When to seek medical advice  Call your healthcare provider right away if any of these occur:    Wound bleeding not controlled by direct pressure    Signs of infection, including increasing pain in the wound, increasing wound redness or swelling, or pus or bad odor coming from the wound    Chills, fever of 100.4 F (38 C) or higher, or as directed by your healthcare provider    Stitches or staples coming apart or falling out or surgical tape falling off before 7 days    Wound edges reopening    Color changes in the wound    Numbness around the wound     Decreased movement around the injured area  [employer] last reviewed this educational content on 6/1/2020 2000-2021 [employer]. All rights reserved. This information is not intended as a substitute for professional medical care. Always follow your healthcare professional's instructions.

## 2022-01-16 NOTE — PROGRESS NOTES
SUBJECTIVE:   31 year old male sustained laceration of left middle finger 1 hours ago. Nature of injury: was using a tool like a chisel without gloves when the tool slipped and scraped back across the knuckle of the left middle finger. Tetanus vaccination status reviewed: tetanus re-vaccination not indicated.     OBJECTIVE:   Patient appears well, vitals are normal. Laceration 1 cm noted.  Description: clean wound edges, no foreign bodies. Neurovascular and tendon structures are intact.    ASSESSMENT:   Laceration as described.    PLAN:   Anesthesia with 1% Lidocaine without Epinephrine. Wound cleansed, debrided of visible foreign material and necrotic tissue, and sutured. Antibiotic ointment and dressing applied.  Wound care instructions provided.  Observe for any signs of infection or other problems.  Return for suture removal in 10 days.    Marlon Grey MD

## 2022-01-25 ENCOUNTER — OFFICE VISIT (OUTPATIENT)
Dept: INTERNAL MEDICINE | Facility: CLINIC | Age: 32
End: 2022-01-25
Payer: COMMERCIAL

## 2022-01-25 ENCOUNTER — NURSE TRIAGE (OUTPATIENT)
Dept: NURSING | Facility: CLINIC | Age: 32
End: 2022-01-25

## 2022-01-25 VITALS
TEMPERATURE: 98.7 F | WEIGHT: 200 LBS | BODY MASS INDEX: 28 KG/M2 | OXYGEN SATURATION: 98 % | HEIGHT: 71 IN | DIASTOLIC BLOOD PRESSURE: 70 MMHG | SYSTOLIC BLOOD PRESSURE: 118 MMHG | HEART RATE: 73 BPM

## 2022-01-25 DIAGNOSIS — R11.0 NAUSEATED: ICD-10-CM

## 2022-01-25 DIAGNOSIS — R10.84 ABDOMINAL PAIN, GENERALIZED: ICD-10-CM

## 2022-01-25 DIAGNOSIS — R19.7 DIARRHEA, UNSPECIFIED TYPE: Primary | ICD-10-CM

## 2022-01-25 DIAGNOSIS — S61.213D LACERATION OF LEFT MIDDLE FINGER WITHOUT FOREIGN BODY WITHOUT DAMAGE TO NAIL, SUBSEQUENT ENCOUNTER: ICD-10-CM

## 2022-01-25 LAB
ALBUMIN SERPL-MCNC: 4.5 G/DL (ref 3.5–5)
ALP SERPL-CCNC: 90 U/L (ref 45–120)
ALT SERPL W P-5'-P-CCNC: 29 U/L (ref 0–45)
ANION GAP SERPL CALCULATED.3IONS-SCNC: 11 MMOL/L (ref 5–18)
AST SERPL W P-5'-P-CCNC: 26 U/L (ref 0–40)
BILIRUB SERPL-MCNC: 0.7 MG/DL (ref 0–1)
BUN SERPL-MCNC: 14 MG/DL (ref 8–22)
CALCIUM SERPL-MCNC: 10.8 MG/DL (ref 8.5–10.5)
CHLORIDE BLD-SCNC: 105 MMOL/L (ref 98–107)
CO2 SERPL-SCNC: 24 MMOL/L (ref 22–31)
CREAT SERPL-MCNC: 0.96 MG/DL (ref 0.7–1.3)
ERYTHROCYTE [DISTWIDTH] IN BLOOD BY AUTOMATED COUNT: 12.3 % (ref 10–15)
GFR SERPL CREATININE-BSD FRML MDRD: >90 ML/MIN/1.73M2
GLUCOSE BLD-MCNC: 74 MG/DL (ref 70–125)
HCT VFR BLD AUTO: 45.6 % (ref 40–53)
HGB BLD-MCNC: 15.3 G/DL (ref 13.3–17.7)
MCH RBC QN AUTO: 28.8 PG (ref 26.5–33)
MCHC RBC AUTO-ENTMCNC: 33.6 G/DL (ref 31.5–36.5)
MCV RBC AUTO: 86 FL (ref 78–100)
PLATELET # BLD AUTO: 264 10E3/UL (ref 150–450)
POTASSIUM BLD-SCNC: 4.6 MMOL/L (ref 3.5–5)
PROT SERPL-MCNC: 7.2 G/DL (ref 6–8)
RBC # BLD AUTO: 5.31 10E6/UL (ref 4.4–5.9)
SARS-COV-2 RNA RESP QL NAA+PROBE: NORMAL
SODIUM SERPL-SCNC: 140 MMOL/L (ref 136–145)
WBC # BLD AUTO: 8.5 10E3/UL (ref 4–11)

## 2022-01-25 PROCEDURE — U0003 INFECTIOUS AGENT DETECTION BY NUCLEIC ACID (DNA OR RNA); SEVERE ACUTE RESPIRATORY SYNDROME CORONAVIRUS 2 (SARS-COV-2) (CORONAVIRUS DISEASE [COVID-19]), AMPLIFIED PROBE TECHNIQUE, MAKING USE OF HIGH THROUGHPUT TECHNOLOGIES AS DESCRIBED BY CMS-2020-01-R: HCPCS | Mod: 90 | Performed by: INTERNAL MEDICINE

## 2022-01-25 PROCEDURE — 80053 COMPREHEN METABOLIC PANEL: CPT | Performed by: INTERNAL MEDICINE

## 2022-01-25 PROCEDURE — 85027 COMPLETE CBC AUTOMATED: CPT | Performed by: INTERNAL MEDICINE

## 2022-01-25 PROCEDURE — 99000 SPECIMEN HANDLING OFFICE-LAB: CPT | Performed by: INTERNAL MEDICINE

## 2022-01-25 PROCEDURE — U0005 INFEC AGEN DETEC AMPLI PROBE: HCPCS | Mod: 90 | Performed by: INTERNAL MEDICINE

## 2022-01-25 PROCEDURE — 99214 OFFICE O/P EST MOD 30 MIN: CPT | Performed by: INTERNAL MEDICINE

## 2022-01-25 PROCEDURE — 36415 COLL VENOUS BLD VENIPUNCTURE: CPT | Performed by: INTERNAL MEDICINE

## 2022-01-25 RX ORDER — CALCIUM CARBONATE 500 MG/1
1 TABLET, CHEWABLE ORAL 2 TIMES DAILY PRN
COMMUNITY
End: 2022-07-11

## 2022-01-25 ASSESSMENT — MIFFLIN-ST. JEOR: SCORE: 1889.32

## 2022-01-25 NOTE — TELEPHONE ENCOUNTER
"Patient calling, stating \"I have had some GI issues.\"  Symptoms starting 10 days ago with diarrhea stools.  Reporting 8-10 watery stools with mucus daily.  Reporting when laying down at night \"stomach so loud with gurgling.\"   Intermittent brief sharp abdominal pain.  Taking fluids.  Afebrile.  Patient attributes symptoms to eating \"bad vegetables\" stating partner did not feel well after eating either.  Patient reporting he is due for suture removal 1/26/22 of left finger.    Disposition to see today in clinic.    Caller verbalized understanding. Denies further questions.    Transferred to Central Scheduling. Reviewed Urgent Care option if unable to schedule same day appointment.    Emilia Aguilar RN  [facility]      COVID 19 Nurse Triage Plan/Patient Instructions    Please be aware that novel coronavirus (COVID-19) may be circulating in the community. If you develop symptoms such as fever, cough, or SOB or if you have concerns about the presence of another infection including coronavirus (COVID-19), please contact your health care provider or visit [email or web address].     Disposition/Instructions    In-Person Visit with provider recommended. Reference Visit Selection Guide.    Thank you for taking steps to prevent the spread of this virus.  o Limit your contact with others.  o Wear a simple mask to cover your cough.  o Wash your hands well and often.    Resources    [facility]: About COVID-19: [email or web address]    CDC: What to Do If You're Sick: www.cdc.gov/coronavirus/2019-ncov/about/steps-when-sick.html    CDC: Ending Home Isolation: www.cdc.gov/coronavirus/2019-ncov/hcp/disposition-in-home-patients.html     CDC: Caring for Someone: www.cdc.gov/coronavirus/2019-ncov/if-you-are-sick/care-for-someone.html     [facility]: Interim Guidance for Hospital Discharge to Home: www.health.[facility].[place]/diseases/coronavirus/hcp/hospdischarge.pdf    [facility] clinical trials " (COVID-19 research studies): [email or web address]-clinical-trials     Below are the COVID-19 hotlines at the [place] Department of [facility] ([facility]). Interpreters are available.   o For health questions: Call 835-473-7408 or 1-135.806.9531 (7 a.m. to 7 p.m.)  o For questions about schools and childcare: Call 855-658-3213 or 1-957.616.3651 (7 a.m. to 7 p.m.)                   Reason for Disposition    SEVERE diarrhea (e.g., 7 or more times / day more than normal) and present > 24 hours (1 day)    Additional Information    Negative: Shock suspected (e.g., cold/pale/clammy skin, too weak to stand, low BP, rapid pulse)    Negative: Difficult to awaken or acting confused (e.g., disoriented, slurred speech)    Negative: Sounds like a life-threatening emergency to the triager    Negative: Vomiting also present and worse than the diarrhea    Negative: Blood in stool and without diarrhea    Negative: SEVERE abdominal pain (e.g., excruciating) and present > 1 hour    Negative: SEVERE abdominal pain and age > 60    Negative: Bloody, black, or tarry bowel movements (Exception: chronic-unchanged black-grey bowel movements and is taking iron pills or Pepto-bismol)    Negative: SEVERE diarrhea (e.g., 7 or more times / day more than normal) and age > 60 years    Negative: Constant abdominal pain lasting > 2 hours    Negative: Drinking very little and has signs of dehydration (e.g., no urine > 12 hours, very dry mouth, very lightheaded)    Negative: Patient sounds very sick or weak to the triager    Protocols used: DIARRHEA-A-OH

## 2022-01-25 NOTE — LETTER
January 27, 2022      RICHI BLANCA Silva  [address]  [place] [address]        Dear ,    We are writing to inform you of your test results.    Covid test is negative.  Blood counts are normal, including the infection fighting cells.  Potassium and other electrolytes are normal, except for the calcium, which is mildly high.  I do not know what that has to do with your diarrhea, if anything.  It could just be a fluke.  You should follow-up with your primary care provider in the next few weeks to readdress this.       Resulted Orders   Symptomatic; Unknown COVID-19 Virus (Coronavirus) by PCR Nose   Result Value Ref Range    SARS CoV2 PCR  Negative     Testing sent to reference lab. Results will be returned via unsolicited result   Comprehensive metabolic panel (BMP + Alb, Alk Phos, ALT, AST, Total. Bili, TP)   Result Value Ref Range    Sodium 140 136 - 145 mmol/L    Potassium 4.6 3.5 - 5.0 mmol/L    Chloride 105 98 - 107 mmol/L    Carbon Dioxide (CO2) 24 22 - 31 mmol/L    Anion Gap 11 5 - 18 mmol/L    Urea Nitrogen 14 8 - 22 mg/dL    Creatinine 0.96 0.70 - 1.30 mg/dL    Calcium 10.8 (H) 8.5 - 10.5 mg/dL    Glucose 74 70 - 125 mg/dL    Alkaline Phosphatase 90 45 - 120 U/L    AST 26 0 - 40 U/L    ALT 29 0 - 45 U/L    Protein Total 7.2 6.0 - 8.0 g/dL    Albumin 4.5 3.5 - 5.0 g/dL    Bilirubin Total 0.7 0.0 - 1.0 mg/dL    GFR Estimate >90 >60 mL/min/1.73m2      Comment:      Effective December 21, 2021 eGFRcr in adults is calculated using the 2021 CKD-EPI creatinine equation which includes age and gender (Francie logan al., NEJM, DOI: 10.1056/SLKHbg1212549)   CBC with platelets   Result Value Ref Range    WBC Count 8.5 4.0 - 11.0 10e3/uL    RBC Count 5.31 4.40 - 5.90 10e6/uL    Hemoglobin 15.3 13.3 - 17.7 g/dL    Hematocrit 45.6 40.0 - 53.0 %    MCV 86 78 - 100 fL    MCH 28.8 26.5 - 33.0 pg    MCHC 33.6 31.5 - 36.5 g/dL    RDW 12.3 10.0 - 15.0 %    Platelet Count 264 150 - 450 10e3/uL   Symptomatic; Unknown COVID-19  Virus (Coronavirus) by PCR Nose   Result Value Ref Range    COVID-19 Virus PCR - Result NOT DETECTED       Comment:      Not Detected    Collection of multiple specimens from the same patient may   be necessary to detect the virus. The possibility of a false   negative should be considered if the patient's recent   exposure or clinical presentation suggests 2019 nCOV   infection and diagnostic tests for other causes of illness   are negative. Repeat testing may be considered in this   setting.    Patient sample was heat inactivated and amplified using the   HDPCR(TM) SARS-CoV-2 assay ([employer] The HDPCRTM   SARS-CoV-2 assay is a reverse transcription real-time   polymerase chain reaction (qRT-PCR) test intended for the   qualitative detection of nucleic acid from SARS-CoV-2 in   human nasopharyngeal swabs, oropharyngeal swabs, anterior   nasal swabs, mid-turbinate nasal swabs as well as nasal   aspirate, nasal wash, and bronchoalveolar lavage (BAL)   specimens from individuals who are suspected of COVID-19 by   their healthcare provider.    A negative result does not rule out the presence of    real-time PCR inhibitors in the specimen or COVID-19 RNA in   concentrations below the limit of detection of the assay.   The possibility of a false negative should be considered if   the patients recent exposure or clinical presentation   suggests COVID-19. Additional testing or repeat testing   requires consultation with the laboratory.    Nasopharyngeal specimen is the preferred choice for   swab-based SARS CoV2 testing. When collection of a   nasopharyngeal swab is not possible the following are   acceptable alternatives:  an oropharyngeal (OP) specimen collected by a healthcare   professional, or nasal mid-turbinate (NMT) swab collected by   a healthcare professional or by onsite self-collection   (using a flocked tapered swab), or an anterior nares   specimen collected by a healthcare professional or by  onsite   self-collection (using a round foam swab). ([facility]   Disease [facility])    Testing performed by [facility] at   the [facility] [address] [facility]   [address].    The test performance characteristics were determined by   [facility]. It has not been cleared or approved by the FDA.    The laboratory is regulated under the Clinical Laboratory   Improvement Amendments of 1988 (CLIA-88) as qualified to   perform high-complexity testing. This test is used for   clinical purposes. It should not be regarded as   investigational or for research.       If you have any questions or concerns, please call the clinic at the number listed above.       Sincerely,      Skyler Ojeda MD

## 2022-01-25 NOTE — PROGRESS NOTES
Office Visit - Follow Up   Jeffy Silva   31 year old male    Date of Visit: 1/25/2022    Chief Complaint   Patient presents with     Diarrhea     x 10 days (loose stools ranges from 6-8 eps per day) no fevers, no bloody stools- using tums prn         Assessment and Plan   1. Diarrhea, unspecified type  Labs as below.  If all this turns out negative and symptoms persist I would recommend Imodium and a trip to gastroenterology.  He has had evaluation with GI few years ago as they thought perhaps he could have inflammatory bowel disease but apparently that turned out negative.  - Symptomatic; Unknown COVID-19 Virus (Coronavirus) by PCR Nose  - Enteric Bacteria and Virus Panel by [facility] Stool; Future  - Clostridium difficile Toxin B PCR; Future  - Comprehensive metabolic panel (BMP + Alb, Alk Phos, ALT, AST, Total. Bili, TP); Future  - CBC with platelets; Future  - Comprehensive metabolic panel (BMP + Alb, Alk Phos, ALT, AST, Total. Bili, TP)  - CBC with platelets    2. Abdominal pain, generalized  As above.  Pain is pretty mild at this time.  - Enteric Bacteria and Virus Panel by [facility] Stool; Future  - Clostridium difficile Toxin B PCR; Future    3. Nauseated  As above.    4. Laceration of left middle finger without foreign body without damage to nail, subsequent encounter  3 sutures removed without difficulty.  Appears this laceration has healed nicely.  I did urge him to keep the area covered and try to minimize reinjuring the area.  I put a Band-Aid over the laceration try to keep him from bending that finger too much        No follow-ups on file.     History of Present Illness   This 31 year old pleasant patient of the [facility] comes in today as an urgent add-on for various different things.  He notes for about 10 days he has had diarrhea and abdominal pain and nausea.  Usually occurs mainly at night.  His occasionally woke him up from sleep but generally occurs after his evening meal when he lies  "down to go to sleep.  He notes a gurgling sensation with nausea and discomfort/pain.  Girlfriend is a nurse practitioner and she had similar symptoms after eating a pork and broccoli dish but hers got better and his is persisted.  There is been no weight loss.  No fevers chills or night sweats.  No blood in the stool.  No known Covid.  He also had a laceration of his finger and had 3 sutures placed about 10 days ago.  None needs to know if these need to come out today.  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    Review of Systems: A comprehensive review of systems was negative except as noted.     Medications, Allergies and Problem List   Reviewed, reconciled and updated  Post Discharge Medication Reconciliation Status:      Physical Exam   General Appearance:   Pleasant gentleman.  No distress.  Vital signs noted.  Abdomen soft and nontender.  Left third finger with laceration that appears to be well-healed over the PIP joint.    /70 (BP Location: Left arm, Patient Position: Sitting, Cuff Size: Adult Small)   Pulse 73   Temp 98.7  F (37.1  C)   Ht 1.803 m (5' 11\")   Wt 90.7 kg (200 lb)   SpO2 98%   BMI 27.89 kg/m           Additional Information   Current Outpatient Medications   Medication Sig Dispense Refill     calcium carbonate (TUMS) 500 MG chewable tablet Take 1 chew tab by mouth 2 times daily as needed for heartburn       FLUoxetine (PROZAC) 20 MG capsule Take 2 capsules (40 mg) by mouth daily 180 capsule 3     hydrOXYzine (VISTARIL) 25 MG capsule Take 1 capsule (25 mg) by mouth 3 times daily as needed for anxiety 20 capsule 1     ibuprofen (ADVIL/MOTRIN) 100 MG tablet Take 100 mg by mouth every 4 hours as needed       LORazepam (ATIVAN) 0.5 MG tablet Take 1 tablet (0.5 mg) by mouth every 6 hours as needed for anxiety 10 tablet 2     melatonin 5 MG tablet Take 5 mg by mouth nightly as needed for sleep       Allergies   Allergen Reactions     Bee Venom Anaphylaxis     Wasp Venom " Protein Anaphylaxis     Social History     Tobacco Use     Smoking status: Never Smoker     Smokeless tobacco: Never Used   Substance Use Topics     Alcohol use: Yes     Comment: Have reduced alcoholic drinks per week down to 2-3 from 8-10     Drug use: Never       Review and/or order of clinical lab tests:  Review and/or order of radiology tests:  Review and/or order of medicine tests:  Discussion of test results with performing physician:  Decision to obtain old records and/or obtain history from someone other than the patient:  Review and summarization of old records and/or obtaining history from someone other than the patient and.or discussion of case with another health care provider:  Independent visualization of image, tracing or specimen itself:    Time:      LUIS ODELL MD

## 2022-01-26 LAB — SARS-COV-2 RNA RESP QL NAA+PROBE: NOT DETECTED

## 2022-03-27 ENCOUNTER — HEALTH MAINTENANCE LETTER (OUTPATIENT)
Age: 32
End: 2022-03-27

## 2022-04-22 ENCOUNTER — NURSE TRIAGE (OUTPATIENT)
Dept: NURSING | Facility: CLINIC | Age: 32
End: 2022-04-22
Payer: COMMERCIAL

## 2022-04-22 NOTE — TELEPHONE ENCOUNTER
Patient calling after testing positive via home test last evening after developing symptoms of mild cough, mild sore throat and mild congestion. No fever. Patient reports symptoms already improving.  Oximeter home reading 98%-99%. Patient has copy of CDC guidelines for home care, when to call and protecting others. Patient has a good understanding of home careand will call back if worsening symptoms or questions.    Esmer Pearson. RAYRAY on 4/22/2022 at 8:57 AM  [facility]    COVID 19 Nurse Triage Plan/Patient Instructions    Please be aware that novel coronavirus (COVID-19) may be circulating in the community. If you develop symptoms such as fever, cough, or SOB or if you have concerns about the presence of another infection including coronavirus (COVID-19), please contact your health care provider or visit [email or web address].     Disposition/Instructions    Home care recommended. Follow home care protocol based instructions.    Thank you for taking steps to prevent the spread of this virus.  o Limit your contact with others.  o Wear a simple mask to cover your cough.  o Wash your hands well and often.    Resources    [facility]: About COVID-19: [email or web address]    CDC: What to Do If You're Sick: www.cdc.gov/coronavirus/2019-ncov/about/steps-when-sick.html    CDC: Ending Home Isolation: www.cdc.gov/coronavirus/2019-ncov/hcp/disposition-in-home-patients.html     CDC: Caring for Someone: www.cdc.gov/coronavirus/2019-ncov/if-you-are-sick/care-for-someone.html     [facility]: Interim Guidance for Hospital Discharge to Home: www.health.[facility].mn.[place]/diseases/coronavirus/hcp/hospdischarge.pdf    [facility] clinical trials (COVID-19 research studies): [email or web address]/um-clinical-trials     Below are the COVID-19 hotlines at the [facility] of Health ([facility]). Interpreters are available.   o For health questions: Call 285-312-3356 or 1-933.158.2205 (7 a.m. to 7  p.m.)  o For questions about schools and childcare: Call 282-675-4359 or 1-863.657.4492 (7 a.m. to 7 p.m.)           Reason for Disposition    [1] COVID-19 diagnosed by positive lab test (e.g., PCR, rapid self-test kit) AND [2] mild symptoms (e.g., cough, fever, others) AND [3] no complications or SOB    Additional Information    Negative: SEVERE difficulty breathing (e.g., struggling for each breath, speaks in single words)    Negative: Difficult to awaken or acting confused (e.g., disoriented, slurred speech)    Negative: Bluish (or gray) lips or face now    Negative: Shock suspected (e.g., cold/pale/clammy skin, too weak to stand, low BP, rapid pulse)    Negative: Sounds like a life-threatening emergency to the triager    Negative: [1] Diagnosed or suspected COVID-19 AND [2] symptoms lasting 3 or more weeks    Negative: [1] COVID-19 exposure AND [2] no symptoms    Negative: COVID-19 vaccine reaction suspected (e.g., fever, headache, muscle aches) occurring 1 to 3 days after getting vaccine    Negative: COVID-19 vaccine, questions about    Negative: [1] Lives with someone known to have influenza (flu test positive) AND [2] flu-like symptoms (e.g., cough, runny nose, sore throat, SOB; with or without fever)    Negative: [1] Adult with possible COVID-19 symptoms AND [2] triager concerned about severity of symptoms or other causes    Negative: COVID-19 and breastfeeding, questions about    Negative: SEVERE or constant chest pain or pressure  (Exception: Mild central chest pain, present only when coughing.)    Negative: MODERATE difficulty breathing (e.g., speaks in phrases, SOB even at rest, pulse 100-120)    Negative: Headache and stiff neck (can't touch chin to chest)    Negative: Oxygen level (e.g., pulse oximetry) 90 percent or lower    Negative: Chest pain or pressure    Negative: Patient sounds very sick or weak to the triager    Negative: MILD difficulty breathing (e.g., minimal/no SOB at rest, SOB with  walking, pulse <100)    Negative: Fever > 103 F (39.4 C)    Negative: [1] Fever > 101 F (38.3 C) AND [2] over 60 years of age    Negative: [1] Fever > 100.0 F (37.8 C) AND [2] bedridden (e.g., nursing home patient, CVA, chronic illness, recovering from surgery)    Negative: HIGH RISK for severe COVID complications (e.g., weak immune system, age > 64 years, obesity with BMI > 25, pregnant, chronic lung disease or other chronic medical condition) (Exception: Already seen by PCP and no new or worsening symptoms.)    Negative: [1] HIGH RISK patient AND [2] influenza is widespread in the community AND [3] ONE OR MORE respiratory symptoms: cough, sore throat, runny or stuffy nose    Negative: [1] HIGH RISK patient AND [2] influenza exposure within the last 7 days AND [3] ONE OR MORE respiratory symptoms: cough, sore throat, runny or stuffy nose    Negative: Oxygen level (e.g., pulse oximetry) 91 to 94 percent    Negative: [1] COVID-19 infection suspected by caller or triager AND [2] mild symptoms (cough, fever, or others) AND [3] negative COVID-19 rapid test    Negative: Fever present > 3 days (72 hours)    Negative: [1] Fever returns after gone for over 24 hours AND [2] symptoms worse or not improved    Negative: [1] Continuous (nonstop) coughing interferes with work or school AND [2] no improvement using cough treatment per Care Advice    Negative: Cough present > 3 weeks    Negative: [1] COVID-19 diagnosed by positive lab test (e.g., PCR, rapid self-test kit) AND [2] NO symptoms (e.g., cough, fever, others)    Protocols used: CORONAVIRUS (COVID-19) DIAGNOSED OR [id number] 1.18.2022

## 2022-04-25 DIAGNOSIS — F43.23 ADJUSTMENT DISORDER WITH MIXED ANXIETY AND DEPRESSED MOOD: ICD-10-CM

## 2022-04-25 NOTE — TELEPHONE ENCOUNTER
Patient is requesting a refill for FLUoxetine (PROZAC) 20 MG capsule, he stats he never used [facility]. He had them transfer the script to [facility] in [place].  Marisela Gallego   [facility]  Central Scheduler

## 2022-04-27 NOTE — TELEPHONE ENCOUNTER
Sent under ordering provider with refills to match original prescription.  CHRISTINA Salinas RN  [facility]

## 2022-06-22 DIAGNOSIS — F43.23 ADJUSTMENT DISORDER WITH MIXED ANXIETY AND DEPRESSED MOOD: ICD-10-CM

## 2022-06-23 NOTE — TELEPHONE ENCOUNTER
Medication is being filled for 1 time refill only due to:  Patient needs to be seen because due for followup. Needs PHQ9   Future appt 7/1/22.    Two weeks vianca.    CHRISTINA Salinas RN  [facility]

## 2022-06-24 ASSESSMENT — ANXIETY QUESTIONNAIRES
7. FEELING AFRAID AS IF SOMETHING AWFUL MIGHT HAPPEN: SEVERAL DAYS
5. BEING SO RESTLESS THAT IT IS HARD TO SIT STILL: NOT AT ALL
4. TROUBLE RELAXING: SEVERAL DAYS
6. BECOMING EASILY ANNOYED OR IRRITABLE: NOT AT ALL
1. FEELING NERVOUS, ANXIOUS, OR ON EDGE: NOT AT ALL
GAD7 TOTAL SCORE: 2
3. WORRYING TOO MUCH ABOUT DIFFERENT THINGS: NOT AT ALL
2. NOT BEING ABLE TO STOP OR CONTROL WORRYING: NOT AT ALL

## 2022-06-24 ASSESSMENT — PATIENT HEALTH QUESTIONNAIRE - PHQ9: SUM OF ALL RESPONSES TO PHQ QUESTIONS 1-9: 5

## 2022-07-08 ASSESSMENT — ANXIETY QUESTIONNAIRES
7. FEELING AFRAID AS IF SOMETHING AWFUL MIGHT HAPPEN: SEVERAL DAYS
2. NOT BEING ABLE TO STOP OR CONTROL WORRYING: NOT AT ALL
GAD7 TOTAL SCORE: 2
4. TROUBLE RELAXING: SEVERAL DAYS
GAD7 TOTAL SCORE: 2
8. IF YOU CHECKED OFF ANY PROBLEMS, HOW DIFFICULT HAVE THESE MADE IT FOR YOU TO DO YOUR WORK, TAKE CARE OF THINGS AT HOME, OR GET ALONG WITH OTHER PEOPLE?: NOT DIFFICULT AT ALL
3. WORRYING TOO MUCH ABOUT DIFFERENT THINGS: NOT AT ALL
7. FEELING AFRAID AS IF SOMETHING AWFUL MIGHT HAPPEN: SEVERAL DAYS
1. FEELING NERVOUS, ANXIOUS, OR ON EDGE: NOT AT ALL
GAD7 TOTAL SCORE: 2
6. BECOMING EASILY ANNOYED OR IRRITABLE: NOT AT ALL
5. BEING SO RESTLESS THAT IT IS HARD TO SIT STILL: NOT AT ALL

## 2022-07-08 ASSESSMENT — PATIENT HEALTH QUESTIONNAIRE - PHQ9
SUM OF ALL RESPONSES TO PHQ QUESTIONS 1-9: 5
SUM OF ALL RESPONSES TO PHQ QUESTIONS 1-9: 5
10. IF YOU CHECKED OFF ANY PROBLEMS, HOW DIFFICULT HAVE THESE PROBLEMS MADE IT FOR YOU TO DO YOUR WORK, TAKE CARE OF THINGS AT HOME, OR GET ALONG WITH OTHER PEOPLE: SOMEWHAT DIFFICULT

## 2022-07-11 ENCOUNTER — OFFICE VISIT (OUTPATIENT)
Dept: FAMILY MEDICINE | Facility: CLINIC | Age: 32
End: 2022-07-11
Payer: COMMERCIAL

## 2022-07-11 VITALS
RESPIRATION RATE: 24 BRPM | HEART RATE: 66 BPM | OXYGEN SATURATION: 98 % | TEMPERATURE: 98 F | DIASTOLIC BLOOD PRESSURE: 78 MMHG | SYSTOLIC BLOOD PRESSURE: 124 MMHG

## 2022-07-11 DIAGNOSIS — F43.23 ADJUSTMENT DISORDER WITH MIXED ANXIETY AND DEPRESSED MOOD: ICD-10-CM

## 2022-07-11 DIAGNOSIS — Z11.59 NEED FOR HEPATITIS C SCREENING TEST: ICD-10-CM

## 2022-07-11 DIAGNOSIS — Z76.89 ENCOUNTER TO ESTABLISH CARE WITH NEW DOCTOR: Primary | ICD-10-CM

## 2022-07-11 DIAGNOSIS — Z11.4 SCREENING FOR HIV (HUMAN IMMUNODEFICIENCY VIRUS): ICD-10-CM

## 2022-07-11 PROBLEM — F43.22 ADJUSTMENT DISORDER WITH ANXIETY: Status: ACTIVE | Noted: 2021-05-19

## 2022-07-11 PROBLEM — L30.9 CHRONIC DERMATITIS: Status: ACTIVE | Noted: 2022-07-11

## 2022-07-11 PROCEDURE — 99214 OFFICE O/P EST MOD 30 MIN: CPT | Performed by: NURSE PRACTITIONER

## 2022-07-11 ASSESSMENT — PATIENT HEALTH QUESTIONNAIRE - PHQ9
SUM OF ALL RESPONSES TO PHQ QUESTIONS 1-9: 5
10. IF YOU CHECKED OFF ANY PROBLEMS, HOW DIFFICULT HAVE THESE PROBLEMS MADE IT FOR YOU TO DO YOUR WORK, TAKE CARE OF THINGS AT HOME, OR GET ALONG WITH OTHER PEOPLE: SOMEWHAT DIFFICULT

## 2022-07-11 ASSESSMENT — ANXIETY QUESTIONNAIRES: GAD7 TOTAL SCORE: 2

## 2022-07-11 NOTE — PROGRESS NOTES
"  Assessment & Plan     Jeffy was seen today for establish care.    Diagnoses and all orders for this visit:    Encounter to establish care with new doctor  Preventative exam w/no abnormalities and/or concerns listed in diagnoses; discussed health maintenance screenings including prostate, breast, cervical and colorectal ca screenings related to gender;  reviewed and reconciled medication, medical history and patient related health concerns  Plan: obtain metabolic labs  -     Lipid Profile; Future  -     Basic metabolic panel; Future    Screening for HIV (human immunodeficiency virus)  Discussed; low risk; declined     Need for hepatitis C screening test  Discussed; low risk; declined     Adjustment disorder with mixed anxiety and depressed mood  Stable; continue current regimen; renewed medication;   MATTHEW-7 SCORE 6/24/2022 6/24/2022 7/8/2022   Total Score - 2 (minimal anxiety) 2 (minimal anxiety)   Total Score 2 2 2   -     FLUoxetine (PROZAC) 20 MG capsule; Take 2 capsules by mouth once daily    Other orders  -     REVIEW OF HEALTH MAINTENANCE PROTOCOL ORDERS             BMI:   Estimated body mass index is 27.89 kg/m  as calculated from the following:    Height as of 1/25/22: 1.803 m (5' 11\").    Weight as of 1/25/22: 90.7 kg (200 lb).       FUTURE APPOINTMENTS:       - Follow up in 2-3 months    DAT [facility]  31 year old  year old male with [facility]   Patient Active Problem List   Diagnosis Code     Adjustment disorder with anxiety F43.22     Chronic dermatitis L30.9     in clinic for acute office visit related to/establish care/to discuss renew medication and establish care.     Patient in clinic to renew medications and establish care. Patient was last followed by HONEY Wilder last seen 7/21/2021 r/t anxiety and depression. Today doing well w/o concerns continues Prozac 40 mg effectively managing his MATTHEW and therapy.      Subjective   AJ is a 31 year old presenting " for the following health issues:    Establish Care      History of Present Illness       Mental Health Follow-up:  Patient presents to follow-up on Depression & Anxiety.Patient's depression since last visit has been:  Better  The patient is having other symptoms associated with depression.  Patient's anxiety since last visit has been:  Better  The patient is not having other symptoms associated with anxiety.  Any significant life events: relationship concerns, job concerns and health concerns  Patient is not feeling anxious or having panic attacks.  Patient has concerns about alcohol or drug use.    He eats 2-3 servings of fruits and vegetables daily.He consumes 2 sweetened beverage(s) daily.He exercises with enough effort to increase his heart rate 60 or more minutes per day.  He exercises with enough effort to increase his heart rate 6 days per week.   He is taking medications regularly.    Today's PHQ-9         PHQ-9 Total Score: 5    PHQ-9 Q9 Thoughts of better off dead/self-harm past 2 weeks :   Not at all    How difficult have these problems made it for you to do your work, take care of things at home, or get along with other people: Somewhat difficult  Today's MATTHEW-7 Score: 2             Review of Systems   Constitutional, HEENT, cardiovascular, pulmonary, gi and gu systems are negative, except as otherwise noted.      Objective    /78 (BP Location: Left arm, Patient Position: Sitting, Cuff Size: Adult Large)   Pulse 66   Temp 98  F (36.7  C) (Temporal)   Resp 24   SpO2 98%   There is no height or weight on file to calculate BMI.         Physical Exam   GENERAL: healthy, alert and no distress  NECK: no adenopathy, no asymmetry, masses, or scars and thyroid normal to palpation  RESP: lungs clear to auscultation - no rales, rhonchi or wheezes  CV: regular rate and rhythm, normal S1 S2, no S3 or S4, no murmur, click or rub, no peripheral edema and peripheral pulses strong  ABDOMEN: soft, nontender, no  hepatosplenomegaly, no masses and bowel sounds normal                    .  ..

## 2022-07-15 ENCOUNTER — OFFICE VISIT (OUTPATIENT)
Dept: BEHAVIORAL HEALTH | Facility: CLINIC | Age: 32
End: 2022-07-15
Payer: COMMERCIAL

## 2022-07-15 DIAGNOSIS — F41.1 GAD (GENERALIZED ANXIETY DISORDER): Primary | ICD-10-CM

## 2022-07-15 PROCEDURE — 90832 PSYTX W PT 30 MINUTES: CPT | Performed by: SOCIAL WORKER
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
## 2022-08-12 ENCOUNTER — OFFICE VISIT (OUTPATIENT)
Dept: BEHAVIORAL HEALTH | Facility: CLINIC | Age: 32
End: 2022-08-12
Payer: COMMERCIAL

## 2022-08-12 DIAGNOSIS — F41.1 GAD (GENERALIZED ANXIETY DISORDER): Primary | ICD-10-CM

## 2022-08-12 PROCEDURE — 90832 PSYTX W PT 30 MINUTES: CPT | Performed by: SOCIAL WORKER
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
## 2022-08-24 ENCOUNTER — OFFICE VISIT (OUTPATIENT)
Dept: URGENT CARE | Facility: URGENT CARE | Age: 32
End: 2022-08-24
Payer: COMMERCIAL

## 2022-08-24 VITALS
DIASTOLIC BLOOD PRESSURE: 77 MMHG | HEART RATE: 68 BPM | TEMPERATURE: 98 F | OXYGEN SATURATION: 98 % | RESPIRATION RATE: 20 BRPM | SYSTOLIC BLOOD PRESSURE: 140 MMHG

## 2022-08-24 DIAGNOSIS — H00.014 HORDEOLUM EXTERNUM OF LEFT UPPER EYELID: Primary | ICD-10-CM

## 2022-08-24 PROCEDURE — 99213 OFFICE O/P EST LOW 20 MIN: CPT | Performed by: PHYSICIAN ASSISTANT

## 2022-08-24 RX ORDER — ERYTHROMYCIN 5 MG/G
0.5 OINTMENT OPHTHALMIC 2 TIMES DAILY
Qty: 3.5 G | Refills: 0 | Status: SHIPPED | OUTPATIENT
Start: 2022-08-24 | End: 2022-08-29

## 2022-08-24 NOTE — PROGRESS NOTES
Assessment & Plan     1. Hordeolum externum of left upper eyelid   Frequent warm soaks, use antibiotic ophthalmic ointment as prescribed, and follow up if symptoms persist or worsen. It may take several days for this to resolve. Rarely, these persist or enlarge and in that event, he will need to see an Ophthalmologist. Patient agrees with the medical treatment plan.    - erythromycin (ROMYCIN) 5 MG/GM ophthalmic ointment; Place 0.5 inches into the right eye 2 times daily for 5 days  Dispense: 3.5 g; Refill: 0        Return in about 3 days (around 8/27/2022), or if symptoms worsen or fail to improve.    Diagnosis and treatment plan was reviewed with patient and/or family.   We went over any labs or imaging. Discussed worsening symptoms or little to no relief despite treatment plan to follow-up with PCP or return to clinic.  Patient verbalizes understanding. All questions were addressed and answered.     Haley Rapp PA-C  [facility]    CHIEF COMPLAINT:   Chief Complaint   Patient presents with     Eye Problem     L eye pain/red and bump      Subjective     AJ is a 31 year old male who presents to clinic today for evaluation of left eye swelling. Patient first noticed it 3-4 days ago. Swelling has stayed the same. He has not tried anything on the area. He does   Patient denies fever, chills, eye pain, eye drainage, change of vision, Foreign body sensation, photophobia, periorbital erythema or headache.         Past Medical History:   Diagnosis Date     Depressive disorder October 2016    Mild/Mod Depression and Anxiety in 2016/2017     Hypertension October 2014    Has recently been brought down to normal levels     Past Surgical History:   Procedure Laterality Date     COLONOSCOPY  June 2016    No Issues     Social History     Tobacco Use     Smoking status: Never Smoker     Smokeless tobacco: Never Used   Substance Use Topics     Alcohol use: Yes     Comment: Have reduced alcoholic  drinks per week down to 2-3 from 8-10     Current Outpatient Medications   Medication     erythromycin (ROMYCIN) 5 MG/GM ophthalmic ointment     hydrOXYzine (VISTARIL) 25 MG capsule     ibuprofen (ADVIL/MOTRIN) 100 MG tablet     melatonin 5 MG tablet     No current facility-administered medications for this visit.     Allergies   Allergen Reactions     Bee Venom Anaphylaxis     Wasp Venom Protein Anaphylaxis       10 point ROS of systems were all negative except for pertinent positives noted in my HPI.      Exam:   BP (!) 140/77   Pulse 68   Temp 98  F (36.7  C) (Tympanic)   Resp 20   SpO2 98%   Constitutional: healthy, alert and no distress  Head: Normocephalic, atraumatic.  Eyes: conjunctiva clear, no drainage. Stye noted on left lower lid  ENT: TMs clear and shiny leann, nasal mucosa pink and moist  Neck: neck is supple, no cervical lymphadenopathy or nuchal rigidity  Cardiovascular: RRR  Respiratory: CTA bilaterally, no rhonchi or rales  Skin: no rashes  Neurologic: Speech clear, gait normal. Moves all extremities.    No results found for any visits on 08/24/22.

## 2022-09-12 ENCOUNTER — OFFICE VISIT (OUTPATIENT)
Dept: FAMILY MEDICINE | Facility: CLINIC | Age: 32
End: 2022-09-12
Payer: COMMERCIAL

## 2022-09-12 VITALS
HEART RATE: 87 BPM | BODY MASS INDEX: 31.52 KG/M2 | SYSTOLIC BLOOD PRESSURE: 120 MMHG | RESPIRATION RATE: 15 BRPM | WEIGHT: 225.12 LBS | DIASTOLIC BLOOD PRESSURE: 78 MMHG | OXYGEN SATURATION: 98 % | HEIGHT: 71 IN | TEMPERATURE: 97.9 F

## 2022-09-12 DIAGNOSIS — Z11.4 SCREENING FOR HIV (HUMAN IMMUNODEFICIENCY VIRUS): ICD-10-CM

## 2022-09-12 DIAGNOSIS — Z13.1 ENCOUNTER FOR SCREENING FOR DIABETES MELLITUS: ICD-10-CM

## 2022-09-12 DIAGNOSIS — Z00.00 ROUTINE GENERAL MEDICAL EXAMINATION AT A HEALTH CARE FACILITY: Primary | ICD-10-CM

## 2022-09-12 DIAGNOSIS — Z11.59 NEED FOR HEPATITIS C SCREENING TEST: ICD-10-CM

## 2022-09-12 DIAGNOSIS — R79.89 ABNORMAL LFTS: ICD-10-CM

## 2022-09-12 DIAGNOSIS — F43.22 ADJUSTMENT DISORDER WITH ANXIETY: ICD-10-CM

## 2022-09-12 LAB
ALBUMIN SERPL-MCNC: 4 G/DL (ref 3.4–5)
ALP SERPL-CCNC: 115 U/L (ref 40–150)
ALT SERPL W P-5'-P-CCNC: 138 U/L (ref 0–70)
ANION GAP SERPL CALCULATED.3IONS-SCNC: 4 MMOL/L (ref 3–14)
AST SERPL W P-5'-P-CCNC: 61 U/L (ref 0–45)
BILIRUB SERPL-MCNC: 0.7 MG/DL (ref 0.2–1.3)
BUN SERPL-MCNC: 18 MG/DL (ref 7–30)
CALCIUM SERPL-MCNC: 9.2 MG/DL (ref 8.5–10.1)
CHLORIDE BLD-SCNC: 106 MMOL/L (ref 94–109)
CHOLEST SERPL-MCNC: 192 MG/DL
CO2 SERPL-SCNC: 27 MMOL/L (ref 20–32)
CREAT SERPL-MCNC: 0.88 MG/DL (ref 0.66–1.25)
ERYTHROCYTE [DISTWIDTH] IN BLOOD BY AUTOMATED COUNT: 12.3 % (ref 10–15)
FASTING STATUS PATIENT QL REPORTED: YES
GFR SERPL CREATININE-BSD FRML MDRD: >90 ML/MIN/1.73M2
GLUCOSE BLD-MCNC: 96 MG/DL (ref 70–99)
HBA1C MFR BLD: 5.4 % (ref 0–5.6)
HCT VFR BLD AUTO: 43 % (ref 40–53)
HDLC SERPL-MCNC: 48 MG/DL
HGB BLD-MCNC: 14.7 G/DL (ref 13.3–17.7)
LDLC SERPL CALC-MCNC: 125 MG/DL
MCH RBC QN AUTO: 28.7 PG (ref 26.5–33)
MCHC RBC AUTO-ENTMCNC: 34.2 G/DL (ref 31.5–36.5)
MCV RBC AUTO: 84 FL (ref 78–100)
NONHDLC SERPL-MCNC: 144 MG/DL
PLATELET # BLD AUTO: 278 10E3/UL (ref 150–450)
POTASSIUM BLD-SCNC: 4.3 MMOL/L (ref 3.4–5.3)
PROT SERPL-MCNC: 7.2 G/DL (ref 6.8–8.8)
RBC # BLD AUTO: 5.13 10E6/UL (ref 4.4–5.9)
SODIUM SERPL-SCNC: 137 MMOL/L (ref 133–144)
TRIGL SERPL-MCNC: 97 MG/DL
WBC # BLD AUTO: 6 10E3/UL (ref 4–11)

## 2022-09-12 PROCEDURE — 80053 COMPREHEN METABOLIC PANEL: CPT | Performed by: NURSE PRACTITIONER

## 2022-09-12 PROCEDURE — 36415 COLL VENOUS BLD VENIPUNCTURE: CPT | Performed by: NURSE PRACTITIONER

## 2022-09-12 PROCEDURE — 99395 PREV VISIT EST AGE 18-39: CPT | Mod: 25 | Performed by: NURSE PRACTITIONER

## 2022-09-12 PROCEDURE — 83036 HEMOGLOBIN GLYCOSYLATED A1C: CPT | Performed by: NURSE PRACTITIONER

## 2022-09-12 PROCEDURE — 90686 IIV4 VACC NO PRSV 0.5 ML IM: CPT | Performed by: NURSE PRACTITIONER

## 2022-09-12 PROCEDURE — 85027 COMPLETE CBC AUTOMATED: CPT | Performed by: NURSE PRACTITIONER

## 2022-09-12 PROCEDURE — 90471 IMMUNIZATION ADMIN: CPT | Performed by: NURSE PRACTITIONER

## 2022-09-12 PROCEDURE — 80061 LIPID PANEL: CPT | Performed by: NURSE PRACTITIONER

## 2022-09-12 ASSESSMENT — ENCOUNTER SYMPTOMS
NERVOUS/ANXIOUS: 0
FREQUENCY: 0
JOINT SWELLING: 0
DIZZINESS: 0
PALPITATIONS: 0
PARESTHESIAS: 0
HEMATOCHEZIA: 0
CHILLS: 0
SHORTNESS OF BREATH: 0
COUGH: 0
DYSURIA: 0
HEMATURIA: 0
WEAKNESS: 0
CONSTIPATION: 0
ABDOMINAL PAIN: 0
FEVER: 0
DIARRHEA: 0
SORE THROAT: 0
EYE PAIN: 0
NAUSEA: 0
ARTHRALGIAS: 0
MYALGIAS: 0
HEARTBURN: 0
HEADACHES: 0

## 2022-09-12 NOTE — PROGRESS NOTES
SUBJECTIVE:   CC: Jeffy Silva is an 31 year old male who presents for preventative health visit.     31 year old year old male  with PMH   Patient Active Problem List   Diagnosis Code     Adjustment disorder with anxiety F43.22     Chronic dermatitis L30.9    in clinic for preventive health care exam. Patient denies any health concerns today. Recently returning for [place] vacation with rosalva. Mental health doing well tapered off fluoxetine; feeling better.    Patient has been advised of split billing requirements and indicates understanding: Yes       Healthy Habits:     Getting at least 3 servings of Calcium per day:  Yes    Bi-annual eye exam:  Yes    Dental care twice a year:  Yes    Sleep apnea or symptoms of sleep apnea:  None    Diet:  Regular (no restrictions)    Frequency of exercise:  2-3 days/week    Duration of exercise:  45-60 minutes    Taking medications regularly:  Yes    Medication side effects:  None    PHQ-2 Total Score: 0    Additional concerns today:  No    Today's PHQ-2 Score:   PHQ-2 ( 1999 [employer]) 9/12/2022   Q1: Little interest or pleasure in doing things 0   Q2: Feeling down, depressed or hopeless 0   PHQ-2 Score 0   PHQ-2 Total Score (12-17 Years)- Positive if 3 or more points; Administer PHQ-A if positive -   Q1: Little interest or pleasure in doing things Not at all   Q2: Feeling down, depressed or hopeless Not at all   PHQ-2 Score 0       Abuse: Current or Past(Physical, Sexual or Emotional)- No  Do you feel safe in your environment? Yes      Social History     Tobacco Use     Smoking status: Never Smoker     Smokeless tobacco: Never Used   Substance Use Topics     Alcohol use: Yes     Comment: Have reduced alcoholic drinks per week down to 2-3 from 8-10     If you drink alcohol do you typically have >3 drinks per day or >7 drinks per week? No    Alcohol Use 9/12/2022   Prescreen: >3 drinks/day or >7 drinks/week? No   AUDIT SCORE  -     AUDIT - Alcohol Use Disorders  Identification Test - Reproduced from the World Health Organization Audit 2001 (Second Edition) 2/18/2021   1.  How often do you have a drink containing alcohol? 4 or more times a week   2.  How many drinks containing alcohol do you have on a typical day when you are drinking? 1 or 2   3.  How often do you have five or more drinks on one occasion? Less than monthly   4.  How often during the last year have you found that you were not able to stop drinking once you had started? Less than monthly   5.  How often during the last year have you failed to do what was normally expected of you because of drinking? Less than monthly   6.  How often during the last year have you needed a first drink in the morning to get yourself going after a heavy drinking session? Never   7.  How often during the last year have you had a feeling of guilt or remorse after drinking? Less than monthly   8.  How often during the last year have you been unable to remember what happened the night before because of your drinking? Never   9.  Have you or someone else been injured because of your drinking? No   10. Has a relative, friend, doctor or other health care worker been concerned about your drinking or suggested you cut down? Yes, during the last year   TOTAL SCORE 12       Last PSA: No results found for: PSA    Reviewed orders with patient. Reviewed health maintenance and updated orders accordingly - Yes  Lab work is in process  Labs reviewed in EPIC  BP Readings from Last 3 Encounters:   09/12/22 120/78   08/24/22 (!) 140/77   07/11/22 124/78    Wt Readings from Last 3 Encounters:   09/12/22 102.1 kg (225 lb 1.9 oz)   01/25/22 90.7 kg (200 lb)   01/16/22 90.7 kg (200 lb)                    Reviewed and updated as needed this visit by clinical staff   Tobacco  Allergies  Meds  Problems  Med Hx  Surg Hx  Fam Hx  Soc   Hx          Reviewed and updated as needed this visit by Provider   Tobacco  Allergies  Meds  Problems  Med Hx   "Surg Hx  Fam Hx               Review of Systems   Constitutional: Negative for chills and fever.   HENT: Negative for congestion, ear pain, hearing loss and sore throat.    Eyes: Negative for pain and visual disturbance.   Respiratory: Negative for cough and shortness of breath.    Cardiovascular: Negative for chest pain, palpitations and peripheral edema.   Gastrointestinal: Negative for abdominal pain, constipation, diarrhea, heartburn, hematochezia and nausea.   Genitourinary: Negative for dysuria, frequency, genital sores, hematuria, impotence, penile discharge and urgency.   Musculoskeletal: Negative for arthralgias, joint swelling and myalgias.   Skin: Negative for rash.   Neurological: Negative for dizziness, weakness, headaches and paresthesias.   Psychiatric/Behavioral: Negative for mood changes. The patient is not nervous/anxious.          OBJECTIVE:   /78 (BP Location: Left arm, Patient Position: Sitting, Cuff Size: Adult Large)   Pulse 87   Temp 97.9  F (36.6  C) (Temporal)   Resp 15   Ht 1.803 m (5' 11\")   Wt 102.1 kg (225 lb 1.9 oz)   SpO2 98%   BMI 31.40 kg/m      Physical Exam  Constitutional:       General: He is not in acute distress.     Appearance: He is well-developed.   HENT:      Right Ear: Tympanic membrane and external ear normal.      Left Ear: Tympanic membrane and external ear normal.      Nose: Nose normal.      Mouth/Throat:      Mouth: No oral lesions.      Pharynx: No oropharyngeal exudate.   Eyes:      General:         Right eye: No discharge.         Left eye: No discharge.      Conjunctiva/sclera: Conjunctivae normal.      Pupils: Pupils are equal, round, and reactive to light.   Neck:      Thyroid: No thyromegaly.      Trachea: No tracheal deviation.   Cardiovascular:      Rate and Rhythm: Normal rate and regular rhythm.      Pulses: Normal pulses.      Heart sounds: Normal heart sounds, S1 normal and S2 normal. No murmur heard.    No S3 or S4 sounds.   Pulmonary: "      Effort: Pulmonary effort is normal. No respiratory distress.      Breath sounds: Normal breath sounds. No wheezing or rales.   Abdominal:      General: Bowel sounds are normal.      Palpations: Abdomen is soft. There is no mass.      Tenderness: There is no abdominal tenderness.   Musculoskeletal:         General: No deformity. Normal range of motion.      Cervical back: Neck supple.   Lymphadenopathy:      Cervical: No cervical adenopathy.   Skin:     General: Skin is warm and dry.      Findings: No lesion or rash.   Neurological:      Mental Status: He is alert and oriented to person, place, and time.      Motor: No abnormal muscle tone.      Deep Tendon Reflexes: Reflexes are normal and symmetric.   Psychiatric:         Speech: Speech normal.         Thought Content: Thought content normal.         Judgment: Judgment normal.           Diagnostic Test Results:  Labs reviewed in Epic    ASSESSMENT/PLAN:   Jeffy was seen today for physical.    Diagnoses and all orders for this visit:    Routine general medical examination at a health care facility  Preventative exam w/no abnormalities and/or concerns listed in diagnoses; discussed health maintenance screenings including prostate, breast, cervical and colorectal ca screenings related to gender;  reviewed and reconciled medication, medical history and patient related health concerns  Plan: obtain metabolic labs  -     Comprehensive metabolic panel; Future  -     CBC with platelets; Future    Screening for HIV (human immunodeficiency virus)  Discussed; low risk; declined     Need for hepatitis C screening test  Discussed; low risk; declined     Encounter for screening for diabetes mellitus  BMI 31  -     Hemoglobin A1c; Future    Adjustment disorder with anxiety  Stable; discontinued selective serotonin reuptake inhibitor   PHQ-9 score:    PHQ 7/8/2022   PHQ-9 Total Score 5   Q9: Thoughts of better off dead/self-harm past 2 weeks Not at all   F/U: Thoughts of  "suicide or self-harm -   F/U: Safety concerns -     MATTHEW-7 SCORE 6/24/2022 6/24/2022 7/8/2022   Total Score - 2 (minimal anxiety) 2 (minimal anxiety)   Total Score 2 2 2       Other orders  -     INFLUENZA VACCINE IM > 6 MONTHS VALENT IIV4 (AFLURIA/FLUZONE)  -     Lipid Profile        Patient has been advised of split billing requirements and indicates understanding: Yes    COUNSELING:   Reviewed preventive health counseling, as reflected in patient instructions       Regular exercise       Healthy diet/nutrition       Family planning    Estimated body mass index is 31.4 kg/m  as calculated from the following:    Height as of this encounter: 1.803 m (5' 11\").    Weight as of this encounter: 102.1 kg (225 lb 1.9 oz).     Weight management plan: Discussed healthy diet and exercise guidelines    He reports that he has never smoked. He has never used smokeless tobacco.      Counseling Resources:  ATP IV Guidelines  Pooled Cohorts Equation Calculator  FRAX Risk Assessment  ICSI Preventive Guidelines  Dietary Guidelines for Americans, 2010  USDA's MyPlate  ASA Prophylaxis  Lung CA Screening    DAT Kraus [facility]  "

## 2022-10-11 NOTE — RESULT ENCOUNTER NOTE
Dear RICHI,     All test are normal except the following require additional follow up    --Your LDL (bad) cholesterol is borderline high, but your overall heart disease risk score is okay.  Continue exercise and heart healthy Mediterranean-style diet rich in fish, olive oil, whole grains, vegetables and low in animal fats and processed foods to reduce your risk of heart disease.    Your liver function tests were abnormal.  This can be due to medications, alcohol or an infection.  I recommend stopping/reducing your alcohol intake and stopping Tylenol for 6 weeks and repeat your liver test.     Please schedule a lab only appointment via [facility] or call [phone] for assistance.     Please let me know if you have any questions or concerns.     Regards,  DAT Kraus CNP

## 2022-10-19 ENCOUNTER — THERAPY VISIT (OUTPATIENT)
Dept: PHYSICAL THERAPY | Facility: CLINIC | Age: 32
End: 2022-10-19
Attending: NURSE PRACTITIONER
Payer: COMMERCIAL

## 2022-10-19 DIAGNOSIS — M79.602 PAIN OF LEFT UPPER EXTREMITY: ICD-10-CM

## 2022-10-19 PROCEDURE — 97161 PT EVAL LOW COMPLEX 20 MIN: CPT | Mod: GP | Performed by: PHYSICAL THERAPIST

## 2022-10-19 PROCEDURE — 97110 THERAPEUTIC EXERCISES: CPT | Mod: GP | Performed by: PHYSICAL THERAPIST

## 2022-10-19 PROCEDURE — 97140 MANUAL THERAPY 1/> REGIONS: CPT | Mod: GP | Performed by: PHYSICAL THERAPIST

## 2022-10-19 NOTE — PROGRESS NOTES
[facility]    OUTPATIENT Physical Therapy ORTHOPEDIC EVALUATION  PLAN OF TREATMENT FOR OUTPATIENT REHABILITATION  (COMPLETE FOR INITIAL CLAIMS ONLY)  Patient's Last Name, First Name, M.I.  YOB: 1990  Jeffy Silva    Provider s Name:  AL[facility]   Medical Record No.  [id number]   Start of Care Date:      Onset Date:       Treatment Diagnosis:    Medical Diagnosis:  Pain of left upper extremity       Goals:     10/19/22 0500   Body Part   Goals listed below are for left elbow   Goal #1   Goal #1 lifting/carrying   Previous Functional Level No restrictions   Current Functional Level Cannot lift;an item to lower cupboard height weighing;Cannot use arm repetitively or for prolonged time    Performance level 5# for 20 minutes 5/10 pain   STG Target Performance Lift an item overhead weighing;Repetitive or prolonged use of arm at shoulder height for;Repetitive or prolonged use of arm overhead for   Performance level 5# with 3/10 pain   Rationale for yard work such as shoveling snow;to perform job duties at work   Due date 11/09/22   LTG Target Performance Repetitive or prolonged use of arm at should height for;Repetitive or prolonged use of arm overhead for   Performance Level 2 hours with 1/10 pain   Rationale for meal preparation;for yard work such as shoveling snow;to perform job duties at work   Due date 12/14/22       Therapy Frequency:     Predicted Duration of Therapy Intervention:       Vicenta Saunders, PT                 I CERTIFY THE NEED FOR THESE SERVICES FURNISHED UNDER        THIS PLAN OF TREATMENT AND WHILE UNDER MY CARE     (Physician attestation of this document indicates review and certification of the therapy plan).                     Certification Date From:      Certification Date To:       Referring Provider:  Denise Gardner  Assessment        See Epic Evaluation

## 2022-10-19 NOTE — PROGRESS NOTES
[facility] Physical Therapy Initial Evaluation  10/19/2022     Precautions/Restrictions/MD instructions: eval and treat    Therapist Assessment: Jeffy Silva is a 32 year old male patient presenting to Physical Therapy with L elbow pain. Patient demonstrates painful activation wrist extension and radial deviation on L, decreased pronation on L, + Cozen's on L, + Mill;s test on L. Signs and symptoms are consistent with L lateral epicondylagia. These impairments limit their ability to perform repeated lifting, repeated wrist twisting, repeated grasping. Skilled PT services are necessary in order to reduce impairments and improve independent function.    Subjective History      Injury/Condition Details:  Presenting Complaint Left elbow pain    Onset Timing/Date 4/1/2022, (Doctor's referral 10/11/2022)   Mechanism Insidious onset, occurred overtime      Symptom Behavior Details    Primary Symptoms Constant symptoms; worsen with activity, pain (Location: lateral forearm, Quality: Aching/Throbbing)       Denies locking, catching, giving way, or instability.     Denies numbness, tingling, changes in sensation.     Denies having related symptoms spreading to the L hand.    Worst Pain 5/10 (with lifting)   Symptom Provocators Lifting, some job tasks    Best Pain 1/10    Symptom Relievers Tylenol, ibuprofen, soaking in hot tub, resting   Time of day dependent? No   Recent symptom change? no change in symptoms     Prior Testing/Intervention for current condition:  Prior Tests  None    Prior Treatment none     Lifestyle & General Medical History:  Employment Self-employed    Usual physical activities  (within past year) Dumbbells, cycling   Orthopaedic History  Thumb dislocation    Medication  None reported by patient    Notable medical history Depression, high blood pressure, overweight    Patient goals Reaching without pain, lifting without pain    Patient Reported  Health good   Answers for HPI/ROS submitted by the patient on 10/12/2022  Reason for Visit:: Pain in left elbow and forearm.  When problem began:: 4/1/2022  How problem occurred:: Unsure, appeared to gradually worsen over time.  Number scale: 3/10  General health as reported by patient: good  Please check all that apply to your current or past medical history: depression, high blood pressure, overweight  Medical allergies: none  Surgeries: none  Medications you are currently taking: none  Occupation:: Self-Employed   What are your primary job tasks: driving, lifting/carrying, pushing/pulling      PHYSICAL THERAPY CERVICAL EXAMINATION    ELBOW RANGE OF MOTION & STRENGTH   PROM L PROM R AROM L AROM R MMT L MMT R   Ext 0 0 0 0 5/5 5/5   Flex 135 135 135 135 5/5 5/5   Pron 80 80 60 90 4/5 5/5   Sup 80 80 80 80 5/5 5/5   Wr Ext 70 70 70 70 4-/5* 5/5   Wr Flex 80 80 80 80 5/5 5/5   (*Indicates patient s pain)    JOINT MOBILITY  Hypomobile anterior radiocarpal L    ELBOW/WRIST:  Palpation: TTP along ECRB    Special Testing:   L R   Ligament     Valgus 0 degrees - -   Valgus 30 degrees - -   Milking - -   Dynamic Milking - -   Lateral Epicondylitis     Resisted wrist ext/radial dev (Cozen's Test)  + -   Resisted finger extension (ECRB)  (Maudsley's Test)  + -   Passive stretch: wrist flex/pronation   (Mill's Test)  + -   Medial Epicondylitis     Resisted wrist flex/ulnar deviation - -   Passive stretch (wrist ext/supination) - -   Nerve Testing - -   Tinel's - -   Nerve tension testing - -       SHOULDER RANGE OF MOTION & STRENGTH   AROM L AROM R MMT L MMT R   Flex 180 180 5/5 5/5    180 5/5 5/5   ER 85 85 4/5 4/5   IR 60 60 5/5 5/5   Ext/IR T5 T5 \ \   (*Indicates patient s pain)    ASSESSMENT/PLAN:  The patient is a 32 year old male with chief complaint of L elbow and forearm pain.    The patient has the following significant findings with corresponding treatment plan.  Diagnosis 1:   Signs and symptoms consistent with left lateral epicondylalgia     Pain -  manual therapy, splint/taping/bracing/orthotics, self management, education, home program and neuro re-education   Decreased ROM/flexibility - manual therapy, therapeutic exercise and home program  Decreased joint mobility - manual therapy, therapeutic exercise and home program  Decreased strength - therapeutic exercise, therapeutic activities and home program  Impaired balance - neuro re-education, therapeutic activities and home program  Decreased proprioception - neuro re-education and therapeutic activities  Impaired gait - gait training and assistive devices  Impaired muscle performance - neuro re-education and home program  Decreased function - therapeutic activities and home program  Impaired posture - neuro re-education, therapeutic activities and home program  Instability -  Therapeutic Activity, Therapeutic Exercise, Neuromuscular Re-education, Splinting/Taping/Bracing/Orthotic, home program      Therapy Evaluation Codes:   1) History comprised of:   Personal factors that impact the plan of care:      None.    Comorbidity factors that impact the plan of care are:      Depression, High blood pressure and Overweight.     Medications impacting care: None.  2) Examination of Body Systems comprised of:   Body structures and functions that impact the plan of care:      Elbow.   Activity limitations that impact the plan of care are:      Cooking and Lifting.  3) Clinical presentation characteristics are:   Stable/Uncomplicated.  4) Decision-Making    Low complexity using standardized patient assessment instrument and/or measureable assessment of functional outcome.  Cumulative Therapy Evaluation is: Low complexity.    Previous and current functional limitations:  (See Goal Flow Sheet for this information)    Short term and Long term goals: (See Goal Flow Sheet for this information)     Communication ability:  Patient appears to be able to  clearly communicate and understand verbal and written communication and follow directions correctly.  Treatment Explanation - The following has been discussed with the patient:   RX ordered/plan of care  Anticipated outcomes  Possible risks and side effects  This patient would benefit from PT intervention to resume normal activities.   Rehab potential is good.    Frequency:  2 X a month, once daily  Duration:  for 4 months  Discharge Plan: Achieve all LTGs, be independent in home treatment program, and reach maximal therapeutic benefit.    Please refer to the daily flowsheet for treatment today, total treatment time and time spent performing 1:1 timed codes.

## 2022-11-10 ENCOUNTER — THERAPY VISIT (OUTPATIENT)
Dept: PHYSICAL THERAPY | Facility: CLINIC | Age: 32
End: 2022-11-10
Payer: COMMERCIAL

## 2022-11-10 DIAGNOSIS — M79.602 PAIN OF LEFT UPPER EXTREMITY: Primary | ICD-10-CM

## 2022-11-10 PROCEDURE — 97140 MANUAL THERAPY 1/> REGIONS: CPT | Mod: GP | Performed by: PHYSICAL THERAPIST

## 2022-11-10 PROCEDURE — 97110 THERAPEUTIC EXERCISES: CPT | Mod: GP | Performed by: PHYSICAL THERAPIST

## 2022-11-23 ENCOUNTER — LAB (OUTPATIENT)
Dept: LAB | Facility: CLINIC | Age: 32
End: 2022-11-23
Payer: COMMERCIAL

## 2022-11-23 DIAGNOSIS — R79.89 ABNORMAL LFTS: ICD-10-CM

## 2022-11-23 LAB
ALBUMIN SERPL BCG-MCNC: 4.7 G/DL (ref 3.5–5.2)
ALP SERPL-CCNC: 89 U/L (ref 40–129)
ALT SERPL W P-5'-P-CCNC: 53 U/L (ref 10–50)
AST SERPL W P-5'-P-CCNC: 35 U/L (ref 10–50)
BILIRUB DIRECT SERPL-MCNC: <0.2 MG/DL (ref 0–0.3)
BILIRUB SERPL-MCNC: 0.6 MG/DL
PROT SERPL-MCNC: 7.1 G/DL (ref 6.4–8.3)

## 2022-11-23 PROCEDURE — 36415 COLL VENOUS BLD VENIPUNCTURE: CPT

## 2022-11-23 PROCEDURE — 80076 HEPATIC FUNCTION PANEL: CPT

## 2023-01-02 PROBLEM — M79.602 PAIN OF LEFT UPPER EXTREMITY: Status: RESOLVED | Noted: 2022-10-19 | Resolved: 2023-01-02

## 2023-01-02 NOTE — PROGRESS NOTES
Discharge Note    Progress reporting period is from initial evaluation date (please see noted date below) to Nov 10, 2022.  Linked Episodes   Type: Episode: Status: Noted: Resolved: Last update: Updated by:   PHYSICAL THERAPY Left elbow pain 10/19/2022 Active 10/19/2022  11/10/2022  8:57 AM Vicenta Ye, PT      Comments:       Jeffy failed to follow up and current status is unknown.  Please see information below for last relevant information on current status.  Patient seen for 2 visits.    SUBJECTIVE  Subjective changes noted by patient:  Patient states that he is feeling better. HE doesn't have constant pain, but does have pain when he needs to stabilize with L arm  .  Current pain level is 0/10.     Previous pain level was   .   Changes in function:  Yes (See Goal flowsheet attached for changes in current functional level)  Adverse reaction to treatment or activity: None    OBJECTIVE  Changes noted in objective findings: TTP along L common extensor tendon elbow     ASSESSMENT/PLAN  Diagnosis: left lateral epicodylalgia   Updated problem list and treatment plan:   Pain - HEP  Decreased ROM/flexibility - HEP  Decreased function - HEP  Decreased strength - HEP  STG/LTGs have been met or progress has been made towards goals:  Yes, please see goal flowsheet for most current information  Assessment of Progress: current status is unknown.    Last current status: Pt is progressing as expected   Self Management Plans:  HEP  I have re-evaluated this patient and find that the nature, scope, duration and intensity of the therapy is appropriate for the medical condition of the patient.  Jeffy continues to require the following intervention to meet STG and LTG's:  HEP.    Recommendations:  Discharge with current home program.  Patient to follow up with MD as needed.    Please refer to the daily flowsheet for treatment today, total treatment time and time spent performing 1:1 timed codes.

## 2023-01-12 NOTE — Clinical Note
RICHI Iyer is  Writer spoke with patient re: aftercare plan. Informed patient that request for RTC authorization was submitted to insurance and CM is waiting to hear back response. Writer explained to patient that should there be an issue with their insurance that case management will notify patient right away. Patient indicated understanding.     Tricia He LPC

## 2023-01-18 ASSESSMENT — ANXIETY QUESTIONNAIRES
GAD7 TOTAL SCORE: 6
7. FEELING AFRAID AS IF SOMETHING AWFUL MIGHT HAPPEN: SEVERAL DAYS
GAD7 TOTAL SCORE: 6
6. BECOMING EASILY ANNOYED OR IRRITABLE: NOT AT ALL
4. TROUBLE RELAXING: SEVERAL DAYS
2. NOT BEING ABLE TO STOP OR CONTROL WORRYING: SEVERAL DAYS
1. FEELING NERVOUS, ANXIOUS, OR ON EDGE: MORE THAN HALF THE DAYS
8. IF YOU CHECKED OFF ANY PROBLEMS, HOW DIFFICULT HAVE THESE MADE IT FOR YOU TO DO YOUR WORK, TAKE CARE OF THINGS AT HOME, OR GET ALONG WITH OTHER PEOPLE?: SOMEWHAT DIFFICULT
GAD7 TOTAL SCORE: 6
7. FEELING AFRAID AS IF SOMETHING AWFUL MIGHT HAPPEN: SEVERAL DAYS
3. WORRYING TOO MUCH ABOUT DIFFERENT THINGS: SEVERAL DAYS
IF YOU CHECKED OFF ANY PROBLEMS ON THIS QUESTIONNAIRE, HOW DIFFICULT HAVE THESE PROBLEMS MADE IT FOR YOU TO DO YOUR WORK, TAKE CARE OF THINGS AT HOME, OR GET ALONG WITH OTHER PEOPLE: SOMEWHAT DIFFICULT
5. BEING SO RESTLESS THAT IT IS HARD TO SIT STILL: NOT AT ALL

## 2023-01-18 ASSESSMENT — PATIENT HEALTH QUESTIONNAIRE - PHQ9: SUM OF ALL RESPONSES TO PHQ QUESTIONS 1-9: 7

## 2023-01-25 ENCOUNTER — VIRTUAL VISIT (OUTPATIENT)
Dept: FAMILY MEDICINE | Facility: CLINIC | Age: 33
End: 2023-01-25
Payer: COMMERCIAL

## 2023-01-25 DIAGNOSIS — F33.0 MILD EPISODE OF RECURRENT MAJOR DEPRESSIVE DISORDER (H): Primary | ICD-10-CM

## 2023-01-25 PROCEDURE — 99214 OFFICE O/P EST MOD 30 MIN: CPT | Mod: 95 | Performed by: NURSE PRACTITIONER

## 2023-01-25 RX ORDER — BUPROPION HYDROCHLORIDE 150 MG/1
150 TABLET ORAL EVERY MORNING
Qty: 60 TABLET | Refills: 0 | Status: SHIPPED | OUTPATIENT
Start: 2023-01-25 | End: 2023-06-14

## 2023-01-25 ASSESSMENT — PATIENT HEALTH QUESTIONNAIRE - PHQ9
SUM OF ALL RESPONSES TO PHQ QUESTIONS 1-9: 7
SUM OF ALL RESPONSES TO PHQ QUESTIONS 1-9: 7
10. IF YOU CHECKED OFF ANY PROBLEMS, HOW DIFFICULT HAVE THESE PROBLEMS MADE IT FOR YOU TO DO YOUR WORK, TAKE CARE OF THINGS AT HOME, OR GET ALONG WITH OTHER PEOPLE: SOMEWHAT DIFFICULT

## 2023-01-25 ASSESSMENT — ANXIETY QUESTIONNAIRES: GAD7 TOTAL SCORE: 6

## 2023-01-25 NOTE — PROGRESS NOTES
RICHI is a 32 year old who is being evaluated via a billable video visit.      How would you like to obtain your AVS? MyChart  If the video visit is dropped, the invitation should be resent by: Text to cell phone: [phone]  Will anyone else be joining your video visit? No        Assessment & Plan     Mild episode of recurrent major depressive disorder (H)  PHQ9 score 7; MATTHEW score 6; feels depressive symptoms are more prevalent at this time due to holidays, loss of dog; weather; has reduce energy; feeling down, feels can sleep all day; would to trial therapy for short period during winter and taper off this spring; discussed buproprion vs fluoxetine; patient agreeable. Will keep at low 150 mg dose and assess need to titrate at follow up  - buPROPion (WELLBUTRIN XL) 150 MG 24 hr tablet  Dispense: 60 tablet; Refill: 0        No follow-ups on file.   Follow-up Visit   Expected date:  Mar 25, 2023 (Approximate)      Follow Up Appointment Details:     Follow-up with whom?: Me    Follow-Up for what?: Chronic Disease f/u    Chronic Disease f/u:  Depression  Anxiety       How?: In Person or Virtual    Is this an as-needed follow-up?: No                    [facility]   RICHI is a 32 year old, presenting for the following health issues:  Recheck Medication (Med Check)    Last visit 9/2022 Mental health doing well tapered off fluoxetine; feeling better; was doing well  Over the summer into fall recent stressors of holidays, lost of 13 yr dog; and   Weather has caused more depressive symptoms;      PHQ 7/8/2022 1/18/2023 1/25/2023   PHQ-9 Total Score 5 7 7   Q9: Thoughts of better off dead/self-harm past 2 weeks Not at all Not at all Not at all   F/U: Thoughts of suicide or self-harm - - -   F/U: Safety concerns - - -     MATTHEW-7 SCORE 6/24/2022 7/8/2022 1/18/2023   Total Score 2 (minimal anxiety) 2 (minimal anxiety) 6 (mild anxiety)   Total Score 2 2 6     Since  completing questionnaires anxiety has improved;has low energy; fatigue; feeling down; depression more concerning; no SI intent or plan    History of Present Illness       Mental Health Follow-up:  Patient presents to follow-up on Depression & Anxiety.Patient's depression since last visit has been:  Better  The patient is not having other symptoms associated with depression.  Patient's anxiety since last visit has been:  Medium  The patient is not having other symptoms associated with anxiety.  Any significant life events: grief or loss  Patient is feeling anxious or having panic attacks.  Patient has no concerns about alcohol or drug use.    He eats 2-3 servings of fruits and vegetables daily.He consumes 3 sweetened beverage(s) daily.He exercises with enough effort to increase his heart rate 20 to 29 minutes per day.  He exercises with enough effort to increase his heart rate 5 days per week.   He is taking medications regularly.    Today's PHQ-9         PHQ-9 Total Score: 7    PHQ-9 Q9 Thoughts of better off dead/self-harm past 2 weeks :   Not at all    How difficult have these problems made it for you to do your work, take care of things at home, or get along with other people: Somewhat difficult  Today's MATTHEW-7 Score: 6             Review of Systems   Constitutional, HEENT, cardiovascular, pulmonary, gi and gu systems are negative, except as otherwise noted.      Objective           Vitals:  No vitals were obtained today due to virtual visit.    Physical Exam   GENERAL: Healthy, alert and no distress  EYES: Eyes grossly normal to inspection.  No discharge or erythema, or obvious scleral/conjunctival abnormalities.  RESP: No audible wheeze, cough, or visible cyanosis.  No visible retractions or increased work of breathing.    SKIN: Visible skin clear. No significant rash, abnormal pigmentation or lesions.  NEURO: Cranial nerves grossly intact.  Mentation and speech appropriate for age.  PSYCH: Mentation appears  normal, affect normal/bright, judgement and insight intact, normal speech and appearance well-groomed.                Video-Visit Details    Type of service:  Video Visit     Joined the call at 1/25/2023, 5:25:27 pm.  Left the call at 1/25/2023, 5:38:28 pm.  You were on the call for 13 minutes 1 second .    Video Start Time:   Video End Time:    Originating Location (pt. Location): Home  Distant Location (provider location):  On-site  Platform used for Video Visit: Devora

## 2023-02-23 ENCOUNTER — MYC MEDICAL ADVICE (OUTPATIENT)
Dept: FAMILY MEDICINE | Facility: CLINIC | Age: 33
End: 2023-02-23
Payer: COMMERCIAL

## 2023-02-23 DIAGNOSIS — F33.0 MILD EPISODE OF RECURRENT MAJOR DEPRESSIVE DISORDER (H): ICD-10-CM

## 2023-02-27 NOTE — TELEPHONE ENCOUNTER
Denise--    See pts message checking in on bupropion dose    She wonders about increasing the dosage. Pended pharmacy    SUNNY EspositoN RN  [facility]

## 2023-02-28 RX ORDER — BUPROPION HYDROCHLORIDE 150 MG/1
150 TABLET, EXTENDED RELEASE ORAL 2 TIMES DAILY
Qty: 180 TABLET | Refills: 3 | Status: SHIPPED | OUTPATIENT
Start: 2023-02-28 | End: 2023-06-14

## 2023-03-01 NOTE — TELEPHONE ENCOUNTER
Done; increase 150 mg BID.  So happy to hear things have improved the starting Bupropion! Yes, I agree with increasing to 150 MG twice a day; pls continue your follow up with me on 3/20.    Thank you,  SW

## 2023-03-01 NOTE — TELEPHONE ENCOUNTER
Writer responded via [employer].    SUNNY TovarN, RN-BC  [facility]

## 2023-03-25 ENCOUNTER — MYC MEDICAL ADVICE (OUTPATIENT)
Dept: FAMILY MEDICINE | Facility: CLINIC | Age: 33
End: 2023-03-25
Payer: COMMERCIAL

## 2023-04-24 ENCOUNTER — OFFICE VISIT (OUTPATIENT)
Dept: URGENT CARE | Facility: URGENT CARE | Age: 33
End: 2023-04-24
Payer: COMMERCIAL

## 2023-04-24 VITALS
BODY MASS INDEX: 29.4 KG/M2 | DIASTOLIC BLOOD PRESSURE: 82 MMHG | HEART RATE: 82 BPM | OXYGEN SATURATION: 98 % | TEMPERATURE: 98.2 F | WEIGHT: 210 LBS | RESPIRATION RATE: 18 BRPM | HEIGHT: 71 IN | SYSTOLIC BLOOD PRESSURE: 118 MMHG

## 2023-04-24 DIAGNOSIS — L30.1 DYSHYDROSIS: Primary | ICD-10-CM

## 2023-04-24 PROCEDURE — 99213 OFFICE O/P EST LOW 20 MIN: CPT | Performed by: PHYSICIAN ASSISTANT

## 2023-04-24 RX ORDER — PREDNISONE 20 MG/1
TABLET ORAL
Qty: 20 TABLET | Refills: 0 | Status: SHIPPED | OUTPATIENT
Start: 2023-04-24 | End: 2023-10-11

## 2023-04-24 NOTE — PROGRESS NOTES
Dyshydrosis  - predniSONE (DELTASONE) 20 MG tablet; Take 3 tabs by mouth daily x 3 days, then 2 tabs daily x 3 days, then 1 tab daily x 3 days, then 1/2 tab daily x 3 days.         Atopic Dermatitis (Adult)  Atopic dermatitis is a dry, itchy, red rash. It s also called eczema. The rash is chronic, or ongoing. It can come and go over time. The disease is often passed down in families. It causes a problem with the skin barrier that makes the skin more sensitive to the environment and other factors. The increased skin sensitivity causes an itch, which causes scratching. Scratching can worsen the itching or also break the skin. This can put the skin at risk of infection.   The condition is most common in people with asthma, hay fever, hives, or dry or sensitive skin. The rash may be caused by extreme heat or heavy sweating. Skin irritants can cause the rash to flare up. These can include wool or silk clothing, grease, oils, some medicines, and harsh soaps and detergents. Emotional stress can also be a trigger.   Treatment is done to relieve the itching and inflammation of the skin. This is often done with home care and over-the-counter treatments. Your healthcare provider may prescribe other treatments.   Home care  Follow these tips to care for your condition:    Keep the areas of rash clean by bathing at least every other day. Use lukewarm water to bathe. Don t use hot water, which can dry out the skin.    Don t use soaps with strong detergents. Use mild soaps made for sensitive skin.    Apply a cream or ointment to damp skin right after bathing.    Stay away from things that irritate your skin. Wear absorbent, soft fabrics next to the skin rather than rough or scratchy materials.    Use mild laundry soap free of scents and perfumes. Make sure to rinse all the soap out of your clothes.    Treat any skin infection as directed.    Use oral diphenhydramine to help reduce itching. This is an antihistamine you can buy at  drug and grocery stores. It can make you sleepy, so use lower doses during the daytime. Be cautious of driving or operating machinery. Or you can use loratadine or other antihistamines that may not make you as sleepy. Don't use diphenhydramine if you have glaucoma or have trouble urinating due to an enlarged prostate.  Follow-up care  See your healthcare provider, or as advised. If your symptoms don t get better or if they get worse in the next 7 days, make an appointment with your healthcare provider.   When to get medical advice  Call your healthcare provider right away if any of these occur:     Increasing area of redness or pain in the skin    Yellow crusts or wet drainage from the rash    Fever of 100.4 F (38 C) or higher, or as directed by your provider  [employer] last reviewed this educational content on 2/1/2022 2000-2022 [employer]. All rights reserved. This information is not intended as a substitute for professional medical care. Always follow your healthcare professional's instructions.                 Jonel Kennedy PA-C  [facility] U[facility]    Subjective   32 year old who presents to clinic today for the following health issues:    Urgent Care and Derm Problem       HPI     Rash  Onset/Duration: Started a month ago but worse over past 5 days.   Description  Location: Rash on hands, shins, knee caps, arms and neck.  Character: blotchy, raised, burning, red  Itching: severe  Intensity:  moderate  Progression of Symptoms:  worsening  Accompanying signs and symptoms:   Fever: No  Body aches or joint pain: No  Sore throat symptoms: No  Recent cold symptoms: No  History:           Previous episodes of similar rash: None   New exposures:  None, but the patient states that it seems to worsen during changes of the seasons.   Recent travel: No  Exposure to similar rash: No  Therapies tried and outcome: Aveeno eczema lotion and claritin       Review of Systems   Review of Systems    See HPI    Objective    Temp: 98.2  F (36.8  C) Temp src: Temporal BP: 118/82 Pulse: 82   Resp: 18 SpO2: 98 %       Physical Exam   Physical Exam  Constitutional:       General: He is not in acute distress.     Appearance: Normal appearance. He is normal weight. He is not ill-appearing, toxic-appearing or diaphoretic.   HENT:      Head: Normocephalic and atraumatic.   Cardiovascular:      Rate and Rhythm: Normal rate.      Pulses: Normal pulses.   Pulmonary:      Effort: Pulmonary effort is normal. No respiratory distress.   Skin:     Findings: Rash present. Rash is vesicular. Rash is not crusting, macular, nodular, papular, purpuric, pustular, scaling or urticarial.          Neurological:      Mental Status: He is alert.   Psychiatric:         Mood and Affect: Mood normal.         Behavior: Behavior normal.         Thought Content: Thought content normal.         Judgment: Judgment normal.          No results found for this or any previous visit (from the past 24 hour(s)).

## 2023-04-27 ENCOUNTER — TELEPHONE (OUTPATIENT)
Dept: URGENT CARE | Facility: URGENT CARE | Age: 33
End: 2023-04-27
Payer: COMMERCIAL

## 2023-04-27 NOTE — TELEPHONE ENCOUNTER
Called and informed pt that I called in the 3 missing 20mg pills that he was shorted from pharmacy. Pt informed that we don't recommend a refill and if symptoms are not improved to be seen again  Brigette Puente, CMA

## 2023-04-27 NOTE — TELEPHONE ENCOUNTER
Patient Returning Call    Reason for call:  Medication refill.     Information relayed to patient:  Informed patient message would be sent for a call back to discuss.     Patient has additional questions:  Yes    What are your questions/concerns:  Patient was seen on 4/24 and prescribed prednisone. Pharmacy the medication was sent to shorted the patient 3 pills due to pharmacy not having full amount. Patient will not have enough for what the provider suggested to take. Patient wondering if he can have a new refill sent to [facility] pharmacy or what he should do. Please return patient call.     Who does the patient want to speak with:  RN    Is an  needed?:  No      Could we send this information to you in [place] or would you prefer to receive a phone call?:   Patient would prefer a phone call   Okay to leave a detailed message?: Yes at Cell number on file:    Telephone Information:   Mobile [phone]

## 2023-05-07 ENCOUNTER — OFFICE VISIT (OUTPATIENT)
Dept: URGENT CARE | Facility: URGENT CARE | Age: 33
End: 2023-05-07
Payer: COMMERCIAL

## 2023-05-07 ENCOUNTER — NURSE TRIAGE (OUTPATIENT)
Dept: NURSING | Facility: CLINIC | Age: 33
End: 2023-05-07

## 2023-05-07 VITALS
DIASTOLIC BLOOD PRESSURE: 84 MMHG | TEMPERATURE: 98.4 F | RESPIRATION RATE: 20 BRPM | OXYGEN SATURATION: 97 % | HEART RATE: 74 BPM | SYSTOLIC BLOOD PRESSURE: 137 MMHG

## 2023-05-07 DIAGNOSIS — L20.84 INTRINSIC ECZEMA: Primary | ICD-10-CM

## 2023-05-07 PROCEDURE — 99213 OFFICE O/P EST LOW 20 MIN: CPT | Performed by: FAMILY MEDICINE

## 2023-05-07 RX ORDER — METHYLPREDNISOLONE 4 MG
TABLET, DOSE PACK ORAL
Qty: 21 TABLET | Refills: 0 | Status: SHIPPED | OUTPATIENT
Start: 2023-05-07 | End: 2023-10-11

## 2023-05-07 RX ORDER — MOMETASONE FUROATE 1 MG/G
CREAM TOPICAL
Qty: 50 G | Refills: 3 | Status: SHIPPED | OUTPATIENT
Start: 2023-05-07 | End: 2023-11-08

## 2023-05-07 NOTE — TELEPHONE ENCOUNTER
Pt calling to report he needs medications prescribed in UC switched to an alternate pharmacy.     He will contact pharmacy he wishes prescriptions filled at to request a pharmacy to pharmacy transfer and call back if needing further assistance.         Reason for Disposition    Caller has medicine question, adult has minor symptoms, caller declines triage, AND triager answers question    Protocols used: MEDICATION QUESTION CALL-A-AH

## 2023-05-07 NOTE — PROGRESS NOTES
Subjective: The previous note.  The oral steroids really did calm things down but now it is all coming back.  He has had this for quite a few years, seems to come with the change of season.  He has not seen a dermatologist about it.    Objective: He has typical eczema bumps in rough skin on his hands and ankles and shins and a little behind his ears.    Assessment and plan: As the steroids helped I think that topical steroids will work.  He says that sometimes they have not worked in the past and that is why he wanted to do the oral steroids but hopefully we can avoid having to do the oral steroids again with a reasonable intermediate strength steroid cream, Elocon, follow-up with dermatology.

## 2023-05-22 ENCOUNTER — OFFICE VISIT (OUTPATIENT)
Dept: URGENT CARE | Facility: URGENT CARE | Age: 33
End: 2023-05-22
Payer: COMMERCIAL

## 2023-05-22 VITALS
HEART RATE: 78 BPM | HEIGHT: 71 IN | RESPIRATION RATE: 16 BRPM | WEIGHT: 206 LBS | BODY MASS INDEX: 28.84 KG/M2 | OXYGEN SATURATION: 97 % | TEMPERATURE: 97.7 F

## 2023-05-22 DIAGNOSIS — L30.1 DYSHIDROSIS: Primary | ICD-10-CM

## 2023-05-22 PROCEDURE — 99213 OFFICE O/P EST LOW 20 MIN: CPT | Performed by: PHYSICIAN ASSISTANT

## 2023-05-22 NOTE — PROGRESS NOTES
Dyshidrosis  - Adult Dermatology Referral; Future    I advised that the patient not undergo a third course of oral steroids at this time.  Instead I would like him to have an urgent follow-up with dermatology in the next 3 to 5 days.  I like him to continue using his topical steroid as well as topical Benadryl or calamine lotion for itch relief.    Jonel Kennedy PA-C  [facility]    Subjective   32 year old who presents to clinic today for the following health issues:    Urgent Care and Derm Problem       HPI     Have been in 2 times in last month due to persistent outbreaks of what is believed to be Dyshidrotic Eczema. The oral steroids prescribed have made the conditions clear up but it keeps returning when meds are out. Topical cream has not helped.  See my previous note for distribution of rash in characteristics.    Review of Systems   Review of Systems   See HPI    Objective    Temp: 97.7  F (36.5  C) Temp src: Temporal   Pulse: 78   Resp: 16 SpO2: 97 %       Physical Exam   Physical Exam  Constitutional:       General: He is not in acute distress.     Appearance: Normal appearance. He is normal weight. He is not ill-appearing, toxic-appearing or diaphoretic.   HENT:      Head: Normocephalic and atraumatic.   Cardiovascular:      Rate and Rhythm: Normal rate.      Pulses: Normal pulses.   Pulmonary:      Effort: Pulmonary effort is normal. No respiratory distress.   Skin:     Comments: Rash is the same as previous note.   Neurological:      General: No focal deficit present.      Mental Status: He is alert and oriented to person, place, and time. Mental status is at baseline.      Gait: Gait normal.   Psychiatric:         Mood and Affect: Mood normal.         Behavior: Behavior normal.         Thought Content: Thought content normal.         Judgment: Judgment normal.          No results found for this or any previous visit (from the past 24 hour(s)).

## 2023-05-23 ENCOUNTER — TELEPHONE (OUTPATIENT)
Dept: DERMATOLOGY | Facility: CLINIC | Age: 33
End: 2023-05-23
Payer: COMMERCIAL

## 2023-05-23 NOTE — TELEPHONE ENCOUNTER
Left message on answering machine for patient to call back.    Kristine PULIDO RN  Dermatology   [phone]

## 2023-05-23 NOTE — CONFIDENTIAL NOTE
Called and spoke with pt and scheduled sooner appt.    Thank you,  Kristine PULIDO RN  Dermatology   [phone]

## 2023-05-23 NOTE — TELEPHONE ENCOUNTER
This encounter is being sent to inform the clinic that this patient has a referral from Jonel Kennedy PA-C, for the diagnoses of Dyshidrosis [L30.1], and has requested that this patient be seen within 3 - 5 days.  Based on the availability of our provider(s), we are unable to accommodate this request.      Were all sites offered this patient?  Yes      Does scheduling algorithm request to schedule next available?  Patient has been scheduled for the first available opening with Dr. Sharpe on 12/4/23.  We have informed the patient that the clinic will review their referral and reach out if a sooner appointment is medically necessary.       Pt's referral is marked URGENT 3 - 5 Days.

## 2023-05-25 ENCOUNTER — OFFICE VISIT (OUTPATIENT)
Dept: DERMATOLOGY | Facility: CLINIC | Age: 33
End: 2023-05-25
Payer: COMMERCIAL

## 2023-05-25 DIAGNOSIS — D22.9 BENIGN NEVUS: ICD-10-CM

## 2023-05-25 DIAGNOSIS — L30.9 ECZEMA, UNSPECIFIED TYPE: Primary | ICD-10-CM

## 2023-05-25 PROCEDURE — 99204 OFFICE O/P NEW MOD 45 MIN: CPT | Performed by: NURSE PRACTITIONER

## 2023-05-25 RX ORDER — CETIRIZINE HYDROCHLORIDE 10 MG/1
10 TABLET ORAL DAILY
Qty: 30 TABLET | Refills: 1 | Status: SHIPPED | OUTPATIENT
Start: 2023-05-25 | End: 2024-09-11

## 2023-05-25 RX ORDER — BETAMETHASONE DIPROPIONATE 0.5 MG/G
OINTMENT, AUGMENTED TOPICAL 2 TIMES DAILY
Qty: 50 G | Refills: 1 | Status: SHIPPED | OUTPATIENT
Start: 2023-05-25 | End: 2023-11-08

## 2023-05-25 RX ORDER — DIPHENHYDRAMINE HCL 25 MG
25 TABLET ORAL AT BEDTIME
Qty: 30 TABLET | Refills: 1 | Status: SHIPPED | OUTPATIENT
Start: 2023-05-25 | End: 2024-09-11

## 2023-05-25 RX ORDER — TRIAMCINOLONE ACETONIDE 1 MG/G
CREAM TOPICAL 2 TIMES DAILY
Qty: 80 G | Refills: 2 | Status: SHIPPED | OUTPATIENT
Start: 2023-05-25 | End: 2024-09-11

## 2023-05-25 NOTE — PATIENT INSTRUCTIONS
Zyrtec 10 mg in the AM, and benadryl 25 mg at night ongoing.         Hand dermatitis.   -I have prescribed a steroid ointment called betamethasone augmented formula  0.05% ointment to use twice daily until resolved and then as needed for flares.    -at night, soak hands in water, apply steroid ointment to rashy areas, and a thick layer of petroleum jelly/Vaseline to the rest of the hands. Cover with cotton gloves overnight during sleep.   -reapply another time during the day when you can keep on for 1+ hours without washing hands  -wear gloves when washing dishes (currently cotton lined gloves or use your cotton gloves underneath your rubber gloves as a barrier), using cleaning supplies, or when hands would otherwise be immersed in soapy water.   -each time you wash your hands you should apply a moisturizing cream immediately afterwards. Examples include Cetaphil cream, Cera Ve Cream, Vanicream or vaseline  -Use a very mild soap such as dove bar soap or even better would be vanicream bar soap. Cut a bar into slices so you can have one in each area that your wash your hands so that she did not to come in contact with any harsh soaps that may be very drying to the hands or cause an allergy.  -The very mindful about anything this coming in contact with your hands that may cause an allergy.  Do not put any moisturizers or chemicals or products on your hands other than things listed above in case you are allergic to something that you are putting on your hands.  Try to avoid contact with rubbers and latex in case this is a problem as well.      Eczema Action Plan    Name: Jeffy Silva Provider: TACHO LIM Date: 5/25/2023    Step 1: Eczema Under Control (Skin is soft and flexible, not red or itchy)  Moisturize the whole body at least two times a day.  Watch for signs that the skin is turning red, itchy, or dry.  Use a cream in a jar from the list below. Do not use lotions from a pump.  Suggested creams:   "CeraVe Cream, Cetaphil Cream, Vanicream, Aquaphor, Vaseline  Use a gentle cleanser/soap in the bath/shower such as dove sensitive cleanser or Cetaphil cleanser only to the armpits and groin areas, or where you are visibly dirty. All other areas should get washed with water only. Do not scrub. After bathing pat the skin dry with a towel instead of rubbing dry. Apply your moisturizer while you're still slightly damp to lock in some of the moisture.     Step 2:  Eczema Flare (Eczema is out of control and not responding to maintenance care)       Continue above procedures  Also, use prescribed steroid ointment two times a day for up to two weeks per month. Apply to red and itchy areas. Put the steroid on the skin first before other creams.     Body:triamcinolone 0.1% cream     Use one fingertip unit of the cream for eczema. A fingertip unit is the amount of ointment from the first bend in finger to the fingertip. This amount will cover an area equal to two adult hands. Using steroids for extended periods of time can thin the skin and cause stretch marks. Never use for longer than 3-4 weeks before following up with your provider. Please follow up in clinic if rash persists and does not resolve with this regimen in 1 months time.     Moisturize your whole body two times a day with a suggested cream.    Wet Wraps  If your rash is very itchy or getting out of control you can also try applying what we call \"wet wraps\". Use your moisturizers/medications where instructed and THEN cover that body area with a wet cotton clothing or cloth and put a second dry layer over the top. You can do this for a short time or even overnight. For example: if your hands are the problem area, try wet cotton gloves or socks and cover with a dry pair. Or if your legs are affected, wet a pair of cotton pajama pants and ring them out then wear them covered with a dry pair overnight.     About Dry Skin    What is dry skin?  Common skin problem  Can " be worse during the winter   Affects all ages  Occurs in people with or without other skin problems    What does it look like?  Fine lines in the skin become more visible   Rough feeling skin   Flaky skin  Most common on the arms and legs  Skin can become cracked, especially on the hands and feet    What are some problems caused by dry skin?   Itching  Rubbing or scratching can cause thickened, rough skin patches  Cracks in skin can be painful  Red, itchy, scaly skin (called eczema) can occur  Yellow crusting or pus could be signs of an infection    What causes dry skin?  A lack of water in the top layer of the skin  Too much soapy water,  hot water, or harsh chemicals  Aging and sun damage    How do I treat dry skin?  Shower or bathe daily for under ten minutes with lukewarm water and mild soap.  Pat yourself dry with a towel gently and leave your skin slightly damp.  Use moisturizing cream or ointment right away.  Avoid lotions.    What kind of mild soap should I be using?  Camay , Dove , Tone , Neutrogena , Purpose , or Oil of Olay   A non-detergent cleanser, like Cetaphil , can be used.    What should I stay away from?  Scented soaps   Bath oils    What moisturizers should I be using?  Cetaphil Cream,CeraVe Cream, Vanicream, Eucerin, Aquaphor, or Vaseline   Always apply after showering or bathing.  Reapply throughout the day, if possible.  If dry skin affects your hands, always reapply after handwashing.    What else should I know?  Using a humidifier during winter months may help.  If dry skin gets worse or if eczema develops, a steroid cream may be needed.

## 2023-05-25 NOTE — LETTER
2023         RE: Jeffy Silva  [address]  [place] [address]        Dear Colleague,    Thank you for referring your patient, Jeffy Silva, to the [facility]\"". Please see a copy of my visit note below.    [facility] Dermatology Note  Encounter Date: May 25, 2023  Office Visit     Reviewed patients past medical history and pertinent chart review prior to patients visit today.     Dermatology Problem List:  Papular eczema - triamcinolone 0.1% ointment twice daily for 2-4 weeks, betamethasone augmented formula  0.05% ointment to use BID until resolved, and Zyrtec 10 mg daily  Patient denies personal history of skin cancer or dysplastic nevi.   Patient denies family history of skin cancer or dysplastic nevi.     Wife is  NP at [employer]  ____________________________________________    Assessment & Plan:     # eczematous dermatitis    -Start triamcinolone 0.1% ointment twice daily for 2-4 weeks to areas on trunk and extremities, decreasing as patients rash improves, repeat cycle for flares. If not fully resolved in 1 month, patient advised to return to clinic for reevaluation. Discussed risk of skin atrophy with long term chronic use. Not for face, armpits or groin.   -Given betamethasone augmented formula  0.05% ointment for hands to use BID until resolved and then BID PRN for flares.  Councled about use and side effects of thinning of the skin, striae, erythema, and worsening of rash.   Not for use in places other than hands or feet.   - Start Zyrtec 10 mg daily and Benadryl 25 mg nightly.   -When bathing, use gentle soap such as Dove for Sensitive Skin and lukewarm water on amrpits, groin, and other visibly dirty areas, all other areas let the water run over but no soap is necessary. Pat skin dry instead of vigorous rubbing. Encouraged regular use of an emollient such as Vanicream, Cera Ve Cream or Cetaphil Cream to the entire body.    -Start a humidifier in bedroom when sleeping if possible for patient. Clean regularly.   reapply another time during the day when patient can keep on for 1+ hours without washing hands  -wear gloves when washing dishes, using cleaning supplies, or when hands would otherwise be immersed in soapy water.  -apply steroid ointment at night to rashy areas, and petroleum jelly to the rest of the hands. Cover with cotton gloves overnight during sleep.   -each time patient washes hands they should apply a moisturizing cream immediately afterwards. Examples include Cetaphil cream, Cera Ve Cream or Vanicream.      # Benign appearing nevus. Benign, no further treatment needed.   If lesion grows, changes, becomes symptomatic, bleeds without trauma, or becomes concerning to patient for any reason I would like to see them back for follow up to recheck for signs of malignancy. I discussed this with patient and patient agrees.        Return to clinic in 1 month if not fully resolved, sooner PRN for new or worsening conditions.     Written by Michelle Gallegos working as a scribe for DAT Valle CNP on 05/25/23    Mariann Rosas CNP  Dermatology   _______________________________________    CC: Rash (Oral and topical steroid prescribed for each flare. Saw Urgent care for TX and possible DX Dyshydrosis/April 2023 was last flare been  has been experiencing since 2018/Flares with season change and it flares and then resolves after a period of /Palms, forearms and shins, extreme itching and painful)    HPI:  Mr. Jeffy Silva is a(n) 32 year old male who presents today as a new patient for rash at multiple areas of the body.  His symptoms initially started in 2010 where he developed clear pustules to the dorsal hands.  At that time, he was seen by his PCP and it was believed to be due to eczema.  He was given oral steroid which did briefly help.  A few months later, his symptoms resurfaced and was given an extended steroid  treatment for a period of time which managed this.  This helped for a long time until he moved to [place] and a drier climate.  He was able to treat this with topical steroids.  His last flare up was in 4/2023 where he started to notice bumps at multiple areas of his skin.  He was seen at urgent care and was given prednisone taper.  This cleared up most of the rash but not completely.  His symptoms flare up again and was given methyl prednisone and mometasone which did help clear his rash.  The papules can be painful.  Usually, topical steroids will not help with the rash.  His symptoms will flare up intermittently lasting for about 2 weeks and can appear in the warmer months.  He has tried using Benadryl a few times which did not help.  He has done allergy testing in the past without environmental allergens detected.  He has not started any new medications.     Patient is otherwise feeling well, without additional skin concerns.    Physical Exam:  Vitals: There were no vitals taken for this visit.  SKIN: Total skin excluding the undergarment areas was performed. The exam included the head/face, neck, both arms, chest, back, abdomen, both legs, digits and/or nails.   -5 mm darker brown macule on the right shoulder homogenous. (nevus)  -Papular erythema on the bilateral dorsum of the hands, volar wrists and forearms, dorsal wrists and forearms, elbows, posterior neck, lower back, and anterior shins.   - No other lesions of concern on areas examined.     Medications:  Current Outpatient Medications   Medication     buPROPion (WELLBUTRIN SR) 150 MG 12 hr tablet     buPROPion (WELLBUTRIN XL) 150 MG 24 hr tablet     hydrOXYzine (VISTARIL) 25 MG capsule     melatonin 5 MG tablet     methylPREDNISolone (MEDROL DOSEPAK) 4 MG tablet therapy pack     mometasone (ELOCON) 0.1 % external cream     predniSONE (DELTASONE) 20 MG tablet     No current facility-administered medications for this visit.      Past Medical History:    Patient Active Problem List   Diagnosis     Adjustment disorder with anxiety     Chronic dermatitis     Past Medical History:   Diagnosis Date     Depressive disorder October 2016    Mild/Mod Depression and Anxiety in 2016/2017     Hypertension October 2014    Has recently been brought down to normal levels       RACHEL Kennedy PA-C  [address] [id number] on close of this encounter.       Again, thank you for allowing me to participate in the care of your patient.        Sincerely,        DAT Hernandez CNP

## 2023-05-25 NOTE — PROGRESS NOTES
[facility] Dermatology Note  Encounter Date: May 25, 2023  Office Visit     Reviewed patients past medical history and pertinent chart review prior to patients visit today.     Dermatology Problem List:  Papular eczema - triamcinolone 0.1% ointment twice daily for 2-4 weeks, betamethasone augmented formula  0.05% ointment to use BID until resolved, and Zyrtec 10 mg daily  Patient denies personal history of skin cancer or dysplastic nevi.   Patient denies family history of skin cancer or dysplastic nevi.     Wife is  NP at [employer]  ____________________________________________    Assessment & Plan:     # eczematous dermatitis    -Start triamcinolone 0.1% ointment twice daily for 2-4 weeks to areas on trunk and extremities, decreasing as patients rash improves, repeat cycle for flares. If not fully resolved in 1 month, patient advised to return to clinic for reevaluation. Discussed risk of skin atrophy with long term chronic use. Not for face, armpits or groin.   -Given betamethasone augmented formula  0.05% ointment for hands to use BID until resolved and then BID PRN for flares.  Councled about use and side effects of thinning of the skin, striae, erythema, and worsening of rash.   Not for use in places other than hands or feet.   - Start Zyrtec 10 mg daily and Benadryl 25 mg nightly.   -When bathing, use gentle soap such as Dove for Sensitive Skin and lukewarm water on amrpits, groin, and other visibly dirty areas, all other areas let the water run over but no soap is necessary. Pat skin dry instead of vigorous rubbing. Encouraged regular use of an emollient such as Vanicream, Cera Ve Cream or Cetaphil Cream to the entire body.   -Start a humidifier in bedroom when sleeping if possible for patient. Clean regularly.   reapply another time during the day when patient can keep on for 1+ hours without washing hands  -wear gloves when washing dishes, using cleaning supplies, or when  hands would otherwise be immersed in soapy water.  -apply steroid ointment at night to rashy areas, and petroleum jelly to the rest of the hands. Cover with cotton gloves overnight during sleep.   -each time patient washes hands they should apply a moisturizing cream immediately afterwards. Examples include Cetaphil cream, Cera Ve Cream or Vanicream.      # Benign appearing nevus. Benign, no further treatment needed.   If lesion grows, changes, becomes symptomatic, bleeds without trauma, or becomes concerning to patient for any reason I would like to see them back for follow up to recheck for signs of malignancy. I discussed this with patient and patient agrees.        Return to clinic in 1 month if not fully resolved, sooner PRN for new or worsening conditions.     Written by Michelle Gallegos working as a scribe for DAT Valle CNP on 05/25/23    Mariann Rosas CNP  Dermatology   _______________________________________    CC: Rash (Oral and topical steroid prescribed for each flare. Saw Urgent care for TX and possible DX Dyshydrosis/April 2023 was last flare been  has been experiencing since 2018/Flares with season change and it flares and then resolves after a period of /Palms, forearms and shins, extreme itching and painful)    HPI:  Mr. Jeffy Silva is a(n) 32 year old male who presents today as a new patient for rash at multiple areas of the body.  His symptoms initially started in 2010 where he developed clear pustules to the dorsal hands.  At that time, he was seen by his PCP and it was believed to be due to eczema.  He was given oral steroid which did briefly help.  A few months later, his symptoms resurfaced and was given an extended steroid treatment for a period of time which managed this.  This helped for a long time until he moved to [place] and a drier climate.  He was able to treat this with topical steroids.  His last flare up was in 4/2023 where he started to notice bumps at multiple  areas of his skin.  He was seen at urgent care and was given prednisone taper.  This cleared up most of the rash but not completely.  His symptoms flare up again and was given methyl prednisone and mometasone which did help clear his rash.  The papules can be painful.  Usually, topical steroids will not help with the rash.  His symptoms will flare up intermittently lasting for about 2 weeks and can appear in the warmer months.  He has tried using Benadryl a few times which did not help.  He has done allergy testing in the past without environmental allergens detected.  He has not started any new medications.     Patient is otherwise feeling well, without additional skin concerns.    Physical Exam:  Vitals: There were no vitals taken for this visit.  SKIN: Total skin excluding the undergarment areas was performed. The exam included the head/face, neck, both arms, chest, back, abdomen, both legs, digits and/or nails.   -5 mm darker brown macule on the right shoulder homogenous. (nevus)  -Papular erythema on the bilateral dorsum of the hands, volar wrists and forearms, dorsal wrists and forearms, elbows, posterior neck, lower back, and anterior shins.   - No other lesions of concern on areas examined.     Medications:  Current Outpatient Medications   Medication     buPROPion (WELLBUTRIN SR) 150 MG 12 hr tablet     buPROPion (WELLBUTRIN XL) 150 MG 24 hr tablet     hydrOXYzine (VISTARIL) 25 MG capsule     melatonin 5 MG tablet     methylPREDNISolone (MEDROL DOSEPAK) 4 MG tablet therapy pack     mometasone (ELOCON) 0.1 % external cream     predniSONE (DELTASONE) 20 MG tablet     No current facility-administered medications for this visit.      Past Medical History:   Patient Active Problem List   Diagnosis     Adjustment disorder with anxiety     Chronic dermatitis     Past Medical History:   Diagnosis Date     Depressive disorder October 2016    Mild/Mod Depression and Anxiety in 2016/2017     Hypertension October 2014     Has recently been brought down to normal levels       CC Jonel Kennedy PA-C  [address] [address] on close of this encounter.

## 2023-06-14 ENCOUNTER — OFFICE VISIT (OUTPATIENT)
Dept: FAMILY MEDICINE | Facility: CLINIC | Age: 33
End: 2023-06-14
Payer: COMMERCIAL

## 2023-06-14 VITALS
TEMPERATURE: 97.6 F | RESPIRATION RATE: 14 BRPM | WEIGHT: 212 LBS | DIASTOLIC BLOOD PRESSURE: 77 MMHG | HEIGHT: 72 IN | SYSTOLIC BLOOD PRESSURE: 136 MMHG | BODY MASS INDEX: 28.71 KG/M2 | HEART RATE: 68 BPM | OXYGEN SATURATION: 99 %

## 2023-06-14 DIAGNOSIS — F33.0 MILD EPISODE OF RECURRENT MAJOR DEPRESSIVE DISORDER (H): ICD-10-CM

## 2023-06-14 DIAGNOSIS — F41.1 GAD (GENERALIZED ANXIETY DISORDER): Primary | ICD-10-CM

## 2023-06-14 PROCEDURE — 99214 OFFICE O/P EST MOD 30 MIN: CPT | Performed by: NURSE PRACTITIONER

## 2023-06-14 RX ORDER — BUPROPION HYDROCHLORIDE 150 MG/1
150 TABLET ORAL EVERY MORNING
Qty: 30 TABLET | Refills: 1 | Status: SHIPPED | OUTPATIENT
Start: 2023-06-14 | End: 2023-10-11

## 2023-06-14 RX ORDER — PROPRANOLOL HYDROCHLORIDE 20 MG/1
20-40 TABLET ORAL 3 TIMES DAILY PRN
Qty: 30 TABLET | Refills: 0 | Status: SHIPPED | OUTPATIENT
Start: 2023-06-14

## 2023-06-14 ASSESSMENT — ANXIETY QUESTIONNAIRES
GAD7 TOTAL SCORE: 6
6. BECOMING EASILY ANNOYED OR IRRITABLE: SEVERAL DAYS
8. IF YOU CHECKED OFF ANY PROBLEMS, HOW DIFFICULT HAVE THESE MADE IT FOR YOU TO DO YOUR WORK, TAKE CARE OF THINGS AT HOME, OR GET ALONG WITH OTHER PEOPLE?: SOMEWHAT DIFFICULT
GAD7 TOTAL SCORE: 6
IF YOU CHECKED OFF ANY PROBLEMS ON THIS QUESTIONNAIRE, HOW DIFFICULT HAVE THESE PROBLEMS MADE IT FOR YOU TO DO YOUR WORK, TAKE CARE OF THINGS AT HOME, OR GET ALONG WITH OTHER PEOPLE: SOMEWHAT DIFFICULT
GAD7 TOTAL SCORE: 6
2. NOT BEING ABLE TO STOP OR CONTROL WORRYING: NOT AT ALL
7. FEELING AFRAID AS IF SOMETHING AWFUL MIGHT HAPPEN: SEVERAL DAYS
3. WORRYING TOO MUCH ABOUT DIFFERENT THINGS: SEVERAL DAYS
1. FEELING NERVOUS, ANXIOUS, OR ON EDGE: SEVERAL DAYS
5. BEING SO RESTLESS THAT IT IS HARD TO SIT STILL: SEVERAL DAYS
4. TROUBLE RELAXING: SEVERAL DAYS
7. FEELING AFRAID AS IF SOMETHING AWFUL MIGHT HAPPEN: SEVERAL DAYS

## 2023-06-14 ASSESSMENT — PATIENT HEALTH QUESTIONNAIRE - PHQ9
SUM OF ALL RESPONSES TO PHQ QUESTIONS 1-9: 3
SUM OF ALL RESPONSES TO PHQ QUESTIONS 1-9: 3
10. IF YOU CHECKED OFF ANY PROBLEMS, HOW DIFFICULT HAVE THESE PROBLEMS MADE IT FOR YOU TO DO YOUR WORK, TAKE CARE OF THINGS AT HOME, OR GET ALONG WITH OTHER PEOPLE: SOMEWHAT DIFFICULT

## 2023-06-14 ASSESSMENT — PAIN SCALES - GENERAL: PAINLEVEL: MILD PAIN (3)

## 2023-06-14 NOTE — PROGRESS NOTES
"  Assessment & Plan     MATTHEW (generalized anxiety disorder)  Will add prn propranolol prn use for social anxiety; general anxiety due to stress while reducing ssnri therapy   - propranolol (INDERAL) 20 MG tablet  Dispense: 30 tablet; Refill: 0    Mild episode of recurrent major depressive disorder (H)      1/18/2023    12:23 PM 1/25/2023     4:43 PM 6/14/2023     1:50 PM   PHQ   PHQ-9 Total Score 7 7 3   Q9: Thoughts of better off dead/self-harm past 2 weeks Not at all Not at all Not at all      improved depressive symptoms; feels well with seasonal change; will reduce from 300 to 150 mg XL daily  - buPROPion (WELLBUTRIN XL) 150 MG 24 hr tablet  Dispense: 30 tablet; Refill: 1               BMI:   Estimated body mass index is 29.16 kg/m  as calculated from the following:    Height as of this encounter: 1.816 m (5' 11.5\").    Weight as of this encounter: 96.2 kg (212 lb).           DAT Kraus [facility]    Subjective   AJ is a 32 year old, presenting for the following health issues:  Nail Problem and Mental Health Problem        6/14/2023     3:53 PM   Additional Questions   Roomed by hilda dutton   Accompanied by none         6/14/2023     3:53 PM   Patient Reported Additional Medications   Patient reports taking the following new medications none     Mental Health Problem    History of Present Illness       Mental Health Follow-up:  Patient presents to follow-up on Depression & Anxiety.Patient's depression since last visit has been:  Good  The patient is having other symptoms associated with depression.  Patient's anxiety since last visit has been:  Good  The patient is not having other symptoms associated with anxiety.  Any significant life events: No  Patient is not feeling anxious or having panic attacks.  Patient has no concerns about alcohol or drug use.    Reason for visit:  [facility] for depression meds and eczema strategy.    He eats 4 or more servings of fruits and " "vegetables daily.He consumes 1 sweetened beverage(s) daily.He exercises with enough effort to increase his heart rate 30 to 60 minutes per day.  He exercises with enough effort to increase his heart rate 6 days per week. He is missing 1 dose(s) of medications per week.  He is not taking prescribed medications regularly due to remembering to take.    Today's PHQ-9         PHQ-9 Total Score: 3    PHQ-9 Q9 Thoughts of better off dead/self-harm past 2 weeks :   Not at all    How difficult have these problems made it for you to do your work, take care of things at home, or get along with other people: Somewhat difficult  Today's MATTHEW-7 Score: 6         1/18/2023    12:23 PM 1/25/2023     4:43 PM 6/14/2023     1:50 PM   PHQ   PHQ-9 Total Score 7 7 3   Q9: Thoughts of better off dead/self-harm past 2 weeks Not at all Not at all Not at all     - doing well with increase in bupropion 150 BID during winter months; increase stressors, loss of family pet; now feels things have improved; spring/summer weather has helped to improve mood wishes to taper down-off medication.  - planning upcoming wedding increase anxiety and stress; discuss add prn medication to assist with stress/anxiety management while tapering off bupropion   - denies SI, intent or plan            Review of Systems         Objective    /77 (BP Location: Right arm, Patient Position: Chair, Cuff Size: Adult Large)   Pulse 68   Temp 97.6  F (36.4  C) (Temporal)   Resp 14   Ht 1.816 m (5' 11.5\")   Wt 96.2 kg (212 lb)   SpO2 99%   BMI 29.16 kg/m    Body mass index is 29.16 kg/m .  Physical Exam   GENERAL: healthy, alert and no distress  NECK: no adenopathy, no asymmetry, masses, or scars and thyroid normal to palpation  RESP: lungs clear to auscultation - no rales, rhonchi or wheezes  CV: regular rate and rhythm, normal S1 S2, no S3 or S4, no murmur, click or rub, no peripheral edema and peripheral pulses strong  ABDOMEN: soft, nontender, no " hepatosplenomegaly, no masses and bowel sounds normal  MS: no gross musculoskeletal defects noted, no edema

## 2023-06-26 RX ORDER — BUPROPION HYDROCHLORIDE 150 MG/1
1 TABLET, FILM COATED, EXTENDED RELEASE ORAL
COMMUNITY
Start: 2023-06-06 | End: 2023-10-11

## 2023-06-30 ENCOUNTER — OFFICE VISIT (OUTPATIENT)
Dept: DERMATOLOGY | Facility: CLINIC | Age: 33
End: 2023-06-30
Payer: COMMERCIAL

## 2023-06-30 DIAGNOSIS — L20.9 ATOPIC DERMATITIS, UNSPECIFIED TYPE: Primary | ICD-10-CM

## 2023-06-30 PROCEDURE — 99213 OFFICE O/P EST LOW 20 MIN: CPT | Performed by: NURSE PRACTITIONER

## 2023-06-30 NOTE — PROGRESS NOTES
[facility] Dermatology Note  Encounter Date: 2023  Office Visit     Reviewed patients past medical history and pertinent chart review prior to patients visit today.     Dermatology Problem List:  Papular eczema - triamcinolone 0.1% ointment twice daily for 2-4 weeks, betamethasone augmented formula  0.05% ointment to use BID until resolved, and Zyrtec 10 mg daily  Patient denies personal history of skin cancer or dysplastic nevi.   Patient denies family history of skin cancer or dysplastic nevi.     Wife is  NP at [facility]  ____________________________________________    Assessment & Plan:     # eczematous dermatitis    -Continue triamcinolone 0.1% ointment twice daily for 2-4 weeks to areas on trunk and extremities, decreasing as patients rash improves, repeat cycle for flares. If not fully resolved in 1 month, patient advised to return to clinic for reevaluation. Discussed risk of skin atrophy with long term chronic use. Not for face, armpits or groin.   -Given betamethasone augmented formula  0.05% ointment for hands to use BID until resolved and then BID PRN for flares.  Councled about use and side effects of thinning of the skin, striae, erythema, and worsening of rash.   Not for use in places other than hands or feet.   -Continue Zyrtec 10 mg daily, okay to increase to twice daily with flares of rash.  -When bathing, use gentle soap such as Dove for Sensitive Skin and lukewarm water on amrpits, groin, and other visibly dirty areas, all other areas let the water run over but no soap is necessary. Pat skin dry instead of vigorous rubbing. Encouraged regular use of an emollient such as Vanicream, Cera Ve Cream or Cetaphil Cream to the entire body.   -Start a humidifier in bedroom when sleeping if possible for patient. Clean regularly.   reapply another time during the day when patient can keep on for 1+ hours without washing hands  -wear gloves when washing dishes,  using cleaning supplies, or when hands would otherwise be immersed in soapy water.  -apply steroid ointment at night to rashy areas, and petroleum jelly to the rest of the hands. Cover with cotton gloves overnight during sleep.   -each time patient washes hands they should apply a moisturizing cream immediately afterwards. Examples include Cetaphil cream, Cera Ve Cream or Vanicream.    -Discussed use of Dupixent, consider Dupixent if patient is not well controlled or is not able to stop the steroid use without flaring.      Return to clinic in 2 to 3 months if not well controlled.  1 year if well controlled with topicals steroids only with flares    Mariann Rosas, [facility]  Dermatology   _______________________________________    CC: Eczema (Ears right shin and arms. Using Vanicream ointment along with Triam 0.1% ointment and areas resolving. Recent flare while in dry climate ( flare 12 days ago) and began to use Triam 2 x a day/. Did not start the Betamethasone and using Zyrtec with Benadryl only if severe Sx/Itching PRN)    HPI:  Mr. Jeffy Silva is a(n) 32 year old male who presents today was last seen by me on 5/25/2023 at which time I gave him some triamcinolone 0.1% ointment for eczema on the body, betamethasone augmented formula ointment for use on the hands, and advised him to use Zyrtec and Benadryl.  He has not been using the Benadryl but has continued to do the Zyrtec once daily.  He was very well controlled until he went to [place] which she says always flares him up.  He was more lenient about use of the steroids and the rash did flare on his hands elbows and shins.  This was just a week or 2 ago so he is still flared.  He has been using the triamcinolone now again for about a week and it is improving.  As a follow-up for eczema.  He    Patient is otherwise feeling well, without additional skin concerns.    Physical Exam:  Vitals: There were no vitals taken for this visit.  SKIN: Arms, elbows, shins,  hands  -Papular erythema on the bilateral dorsum of the hands, elbows, and anterior shins.  Improvement since last visit    - No other lesions of concern on areas examined.     Medications:  Current Outpatient Medications   Medication     cetirizine (ZYRTEC) 10 MG tablet     diphenhydrAMINE (BENADRYL) 25 MG tablet     triamcinolone (KENALOG) 0.1 % external cream     augmented betamethasone dipropionate (DIPROLENE-AF) 0.05 % external ointment     buPROPion (WELLBUTRIN XL) 150 MG 24 hr tablet     buPROPion (ZYBAN) 150 MG 12 hr tablet     melatonin 5 MG tablet     methylPREDNISolone (MEDROL DOSEPAK) 4 MG tablet therapy pack     mometasone (ELOCON) 0.1 % external cream     predniSONE (DELTASONE) 20 MG tablet     propranolol (INDERAL) 20 MG tablet     No current facility-administered medications for this visit.      Past Medical History:   Patient Active Problem List   Diagnosis     Adjustment disorder with anxiety     Chronic dermatitis     Mitral regurgitation     Palpitations     Past Medical History:   Diagnosis Date     Depressive disorder October 2016    Mild/Mod Depression and Anxiety in 2016/2017     Hypertension October 2014    Has recently been brought down to normal levels     Mitral regurgitation 12/16/2014       CC Jonel Kennedy PA-C  [address] [address] on close of this encounter.

## 2023-06-30 NOTE — PATIENT INSTRUCTIONS
Patient Education       Proper skin care from [facility]:    -Eliminate harsh soaps as they strip the natural oils from the skin, often resulting in dry itchy skin ( i.e. Dial, Zest, [place])  -Use mild soaps such as Cetaphil or Dove Sensitive Skin in the shower. You do not need to use soap on arms, legs, and trunk every time you shower unless visibly soiled.   -Avoid hot or cold showers.  -After showering, lightly dry off and apply moisturizing within 2-3 minutes. This will help trap moisture in the skin.   -Aggressive use of a moisturizer at least 1-2 times a day to the entire body (including -Vanicream, Cetaphil, Aquaphor or Cerave) and moisturize hands after every washing.  -We recommend using moisturizers that come in a tub that needs to be scooped out, not a pump. This has more of an oil base. It will hold moisture in your skin much better than a water base moisturizer. The above recommended are non-pore clogging.      Wear a sunscreen with at least SPF 30 on your face, ears, neck and V of the chest daily. Wear sunscreen on other areas of the body if those areas are exposed to the sun throughout the day. Sunscreens can contain physical and/or chemical blockers. Physical blockers are less likely to clog pores, these include zinc oxide and titanium dioxide. Reapply every two hour and after swimming.     Sunscreen examples: https://www.ewg.org/sunscreen/    UV radiation  UVA radiation remains constant throughout the day and throughout the year. It is a longer wavelength than UVB and therefore penetrates deeper into the skin leading to immediate and delayed tanning, photoaging, and skin cancer. 70-80% of UVA and UVB radiation occurs between the hours of 10am-2pm.  UVB radiation  UVB radiation causes the most harmful effects and is more significant during the summer months. However, snow and ice can reflect UVB radiation leading to skin damage during the winter months as well. UVB radiation is  responsible for tanning, burning, inflammation, delayed erythema (pinkness), pigmentation (brown spots), and skin cancer.     I recommend self monthly full body exams and yearly full body exams with a dermatology provider. If you develop a new or changing lesion please follow up for examination. Most skin cancers are pink and scaly or pink and pearly. However, we do see blue/brown/black skin cancers.  Consider the ABCDEs of melanoma when giving yourself your monthly full body exam ( don't forget the groin, buttocks, feet, toes, etc). A-asymmetry, B-borders, C-color, D-diameter, E-elevation or evolving. If you see any of these changes please follow up in clinic. If you cannot see your back I recommend purchasing a hand held mirror to use with a larger wall mirror.       Checking for Skin Cancer  You can find cancer early by checking your skin each month. There are 3 kinds of skin cancer. They are melanoma, basal cell carcinoma, and squamous cell carcinoma. Doing monthly skin checks is the best way to find new marks or skin changes. Follow the instructions below for checking your skin.   The ABCDEs of checking moles for melanoma   Check your moles or growths for signs of melanoma using ABCDE:   Asymmetry: the sides of the mole or growth don t match  Border: the edges are ragged, notched, or blurred  Color: the color within the mole or growth varies  Diameter: the mole or growth is larger than 6 mm (size of a pencil eraser)  Evolving: the size, shape, or color of the mole or growth is changing (evolving is not shown in the images below)    Checking for other types of skin cancer  Basal cell carcinoma or squamous cell carcinoma have symptoms such as:     A spot or mole that looks different from all other marks on your skin  Changes in how an area feels, such as itching, tenderness, or pain  Changes in the skin's surface, such as oozing, bleeding, or scaliness  A sore that does not heal  New swelling or redness beyond  the border of a mole    Who s at risk?  Anyone can get skin cancer. But you are at greater risk if you have:   Fair skin, light-colored hair, or light-colored eyes  Many moles or abnormal moles on your skin  A history of sunburns from sunlight or tanning beds  A family history of skin cancer  A history of exposure to radiation or chemicals  A weakened immune system  If you have had skin cancer in the past, you are at risk for recurring skin cancer.   How to check your skin  Do your monthly skin checkups in front of a full-length mirror. Check all parts of your body, including your:   Head (ears, face, neck, and scalp)  Torso (front, back, and sides)  Arms (tops, undersides, upper, and lower armpits)  Hands (palms, backs, and fingers, including under the nails)  Buttocks and genitals  Legs (front, back, and sides)  Feet (tops, soles, toes, including under the nails, and between toes)  If you have a lot of moles, take digital photos of them each month. Make sure to take photos both up close and from a distance. These can help you see if any moles change over time.   Most skin changes are not cancer. But if you see any changes in your skin, call your doctor right away. Only he or she can diagnose a problem. If you have skin cancer, seeing your doctor can be the first step toward getting the treatment that could save your life.   [employer] last reviewed this educational content on 4/1/2019 2000-2020 [employer]. [address] [place] [address], [place] [address]. All rights reserved. This information is not intended as a substitute for professional medical care. Always follow your healthcare professional's instructions.       When should I call my doctor?  If you are worsening or not improving, please, contact us or seek urgent care as noted below.     Who should I call with questions (adults)?  [facility] (adult and pediatric): [phone]  [facility]  [place] (adult): [phone]  [facility] ([place], [place], [place] and [place]) [phone]  For urgent needs outside of business hours call the [facility] at [phone] and ask for the dermatology resident on call to be paged  If this is a medical emergency and you are unable to reach an ER, Call 911      If you need a prescription refill, please contact your pharmacy. Refills are approved or denied by our Physicians during normal business hours, Monday through Fridays  Per office policy, refills will not be granted if you have not been seen within the past year (or sooner depending on your child's condition)

## 2023-06-30 NOTE — LETTER
2023         RE: Jeffy Silva  [address]  [place] [address]        Dear Colleague,    Thank you for referring your patient, Jeffy Silva, to the [facility]. Please see a copy of my visit note below.    [facility] Dermatology Note  Encounter Date: 2023  Office Visit     Reviewed patients past medical history and pertinent chart review prior to patients visit today.     Dermatology Problem List:  Papular eczema - triamcinolone 0.1% ointment twice daily for 2-4 weeks, betamethasone augmented formula  0.05% ointment to use BID until resolved, and Zyrtec 10 mg daily  Patient denies personal history of skin cancer or dysplastic nevi.   Patient denies family history of skin cancer or dysplastic nevi.     Wife is  NP at [facility]  ____________________________________________    Assessment & Plan:     # eczematous dermatitis    -Continue triamcinolone 0.1% ointment twice daily for 2-4 weeks to areas on trunk and extremities, decreasing as patients rash improves, repeat cycle for flares. If not fully resolved in 1 month, patient advised to return to clinic for reevaluation. Discussed risk of skin atrophy with long term chronic use. Not for face, armpits or groin.   -Given betamethasone augmented formula  0.05% ointment for hands to use BID until resolved and then BID PRN for flares.  Councled about use and side effects of thinning of the skin, striae, erythema, and worsening of rash.   Not for use in places other than hands or feet.   -Continue Zyrtec 10 mg daily, okay to increase to twice daily with flares of rash.  -When bathing, use gentle soap such as Dove for Sensitive Skin and lukewarm water on amrpits, groin, and other visibly dirty areas, all other areas let the water run over but no soap is necessary. Pat skin dry instead of vigorous rubbing. Encouraged regular use of an emollient such as Vanicream, Cera Ve Cream or  Cetaphil Cream to the entire body.   -Start a humidifier in bedroom when sleeping if possible for patient. Clean regularly.   reapply another time during the day when patient can keep on for 1+ hours without washing hands  -wear gloves when washing dishes, using cleaning supplies, or when hands would otherwise be immersed in soapy water.  -apply steroid ointment at night to rashy areas, and petroleum jelly to the rest of the hands. Cover with cotton gloves overnight during sleep.   -each time patient washes hands they should apply a moisturizing cream immediately afterwards. Examples include Cetaphil cream, Cera Ve Cream or Vanicream.    -Discussed use of Dupixent, consider Dupixent if patient is not well controlled or is not able to stop the steroid use without flaring.      Return to clinic in 2 to 3 months if not well controlled.  1 year if well controlled with topicals steroids only with flares    Mariann Rosas, [facility]  Dermatology   _______________________________________    CC: Eczema (Ears right shin and arms. Using Vanicream ointment along with Triam 0.1% ointment and areas resolving. Recent flare while in dry climate ( flare 12 days ago) and began to use Triam 2 x a day/. Did not start the Betamethasone and using Zyrtec with Benadryl only if severe Sx/Itching PRN)    HPI:  Mr. Jeffy Silva is a(n) 32 year old male who presents today was last seen by me on 5/25/2023 at which time I gave him some triamcinolone 0.1% ointment for eczema on the body, betamethasone augmented formula ointment for use on the hands, and advised him to use Zyrtec and Benadryl.  He has not been using the Benadryl but has continued to do the Zyrtec once daily.  He was very well controlled until he went to [place] which she says always flares him up.  He was more lenient about use of the steroids and the rash did flare on his hands elbows and shins.  This was just a week or 2 ago so he is still flared.  He has been using the  triamcinolone now again for about a week and it is improving.  As a follow-up for eczema.  He    Patient is otherwise feeling well, without additional skin concerns.    Physical Exam:  Vitals: There were no vitals taken for this visit.  SKIN: Arms, elbows, shins, hands  -Papular erythema on the bilateral dorsum of the hands, elbows, and anterior shins.  Improvement since last visit    - No other lesions of concern on areas examined.     Medications:  Current Outpatient Medications   Medication     cetirizine (ZYRTEC) 10 MG tablet     diphenhydrAMINE (BENADRYL) 25 MG tablet     triamcinolone (KENALOG) 0.1 % external cream     augmented betamethasone dipropionate (DIPROLENE-AF) 0.05 % external ointment     buPROPion (WELLBUTRIN XL) 150 MG 24 hr tablet     buPROPion (ZYBAN) 150 MG 12 hr tablet     melatonin 5 MG tablet     methylPREDNISolone (MEDROL DOSEPAK) 4 MG tablet therapy pack     mometasone (ELOCON) 0.1 % external cream     predniSONE (DELTASONE) 20 MG tablet     propranolol (INDERAL) 20 MG tablet     No current facility-administered medications for this visit.      Past Medical History:   Patient Active Problem List   Diagnosis     Adjustment disorder with anxiety     Chronic dermatitis     Mitral regurgitation     Palpitations     Past Medical History:   Diagnosis Date     Depressive disorder October 2016    Mild/Mod Depression and Anxiety in 2016/2017     Hypertension October 2014    Has recently been brought down to normal levels     Mitral regurgitation 12/16/2014       CC Jonel Kennedy PA-C  [address] [address] on close of this encounter.       Again, thank you for allowing me to participate in the care of your patient.        Sincerely,        DAT Hernandez CNP

## 2023-08-14 ENCOUNTER — PATIENT OUTREACH (OUTPATIENT)
Dept: CARE COORDINATION | Facility: CLINIC | Age: 33
End: 2023-08-14
Payer: COMMERCIAL

## 2023-08-20 ENCOUNTER — OFFICE VISIT (OUTPATIENT)
Dept: URGENT CARE | Facility: URGENT CARE | Age: 33
End: 2023-08-20
Payer: COMMERCIAL

## 2023-08-20 VITALS
DIASTOLIC BLOOD PRESSURE: 78 MMHG | WEIGHT: 205 LBS | SYSTOLIC BLOOD PRESSURE: 122 MMHG | BODY MASS INDEX: 27.77 KG/M2 | HEIGHT: 72 IN | HEART RATE: 84 BPM | TEMPERATURE: 98 F | OXYGEN SATURATION: 98 % | RESPIRATION RATE: 16 BRPM
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
## 2023-08-20 DIAGNOSIS — S10.96XA INSECT BITE OF NECK, INITIAL ENCOUNTER: Primary | ICD-10-CM

## 2023-08-20 PROCEDURE — 99213 OFFICE O/P EST LOW 20 MIN: CPT | Performed by: FAMILY MEDICINE

## 2023-08-20 RX ORDER — EPINEPHRINE 0.3 MG/.3ML
0.3 INJECTION SUBCUTANEOUS PRN
Qty: 2 EACH | Refills: 1 | Status: SHIPPED | OUTPATIENT
Start: 2023-08-20

## 2023-08-20 RX ORDER — METHYLPREDNISOLONE 4 MG
4 TABLET, DOSE PACK ORAL SEE ADMIN INSTRUCTIONS
Qty: 21 TABLET | Refills: 0 | Status: SHIPPED | OUTPATIENT
Start: 2023-08-20 | End: 2023-11-08

## 2023-08-21 NOTE — PROGRESS NOTES
ASSESSMENT/  PLAN:  Insect bite of neck, initial encounter     - methylPREDNISolone (MEDROL) 4 MG tablet therapy pack; Take 1 tablet (4 mg) by mouth See Admin Instructions follow package directions  - EPINEPHrine (ANY BX GENERIC EQUIV) 0.3 MG/0.3ML injection 2-pack; Inject 0.3 mLs (0.3 mg) into the muscle as needed for anaphylaxis May repeat one time in 5-15 minutes if response to initial dose is inadequate.    His bee sting reaction is not severe, but due to anaphylaxis history and plans for  prolonged road trip-  he will start steroid taper and keep epipen available     OTC antihistamine prn-  may take up to 4 pills if severe allergic reaction     Patient should return to  or primary MD if worsening       ------------------------------------------------------------------------------------------------------------------------------------------------------------------    SUBJECTIVE:  Chief Complaint   Patient presents with    Urgent Care    Insect Bite     Stung by probable bee yesterday morning on left side of neck. Red and swollen. Was allergic to bees as a child but now okay.      Jeffy Silva is a 32 year old male who presents with a chief complaint of an insect bite on the left  lateral neck.   Was bitten by a bee, he thinks yesterday am   Severity: mild yesterday, but today increased redness and swelling-  he feels like some irritation in the throat, but only when he turns the head,   no coughing, no SOB   Associated symptoms: redness noted, swelling and edema at site  No problems of wheezing, mouth/ throat swelling, shortness of breath.  No hives  No fevers or chills    As a child he had anaphylaxis to bee sting-  but had no reaction on testing at allergist a year ago.    He is concerned he is having allergic reaction to bee-  also he has 16 hour drive to [place] over the next day and wants to have treatment if needed for the road trip    last tetanus booster within 10 years    Past Medical  History:   Diagnosis Date    Depressive disorder October 2016    Mild/Mod Depression and Anxiety in 2016/2017    Hypertension October 2014    Has recently been brought down to normal levels    Mitral regurgitation 12/16/2014     Patient Active Problem List   Diagnosis    Adjustment disorder with anxiety    Chronic dermatitis    Mitral regurgitation    Palpitations       ALLERGIES:  Patient has no known allergies.    buPROPion (WELLBUTRIN XL) 150 MG 24 hr tablet, Take 1 tablet (150 mg) by mouth every morning  cetirizine (ZYRTEC) 10 MG tablet, Take 1 tablet (10 mg) by mouth daily  augmented betamethasone dipropionate (DIPROLENE-AF) 0.05 % external ointment, Apply topically 2 times daily To rash on hands until healed (Patient not taking: Reported on 6/30/2023)  buPROPion (ZYBAN) 150 MG 12 hr tablet, Take 1 tablet by mouth 2 times daily  diphenhydrAMINE (BENADRYL) 25 MG tablet, Take 1 tablet (25 mg) by mouth At Bedtime (Patient taking differently: Take 25 mg by mouth At Bedtime PRN)  melatonin 5 MG tablet, Take 5 mg by mouth nightly as needed for sleep  methylPREDNISolone (MEDROL DOSEPAK) 4 MG tablet therapy pack, Follow Package Directions (Patient not taking: Reported on 6/30/2023)  mometasone (ELOCON) 0.1 % external cream, Apply twice daily (Patient not taking: Reported on 6/30/2023)  predniSONE (DELTASONE) 20 MG tablet, Take 3 tabs by mouth daily x 3 days, then 2 tabs daily x 3 days, then 1 tab daily x 3 days, then 1/2 tab daily x 3 days. (Patient not taking: Reported on 6/30/2023)  propranolol (INDERAL) 20 MG tablet, Take 1-2 tablets (20-40 mg) by mouth 3 times daily as needed (anxiety)  triamcinolone (KENALOG) 0.1 % external cream, Apply topically 2 times daily To rash on body and extremities as needed    No current facility-administered medications on file prior to visit.      Social History     Tobacco Use    Smoking status: Never     Passive exposure: Never    Smokeless tobacco: Never   Substance Use Topics     "Alcohol use: Yes     Comment: Have reduced alcoholic drinks per week down to 2-3 from 8-10       Family History   Problem Relation Age of Onset    Hyperlipidemia Maternal Grandmother        ROS:  CONSTITUTIONAL:NEGATIVE for fever, chills,    EYES: NEGATIVE for vision changes or irritation  ENT/MOUTH: NEGATIVE for ear, mouth and throat problems  RESP:NEGATIVE for significant cough or SOB  GI: NEGATIVE for nausea, abdominal pain, heartburn, or change in bowel habits  Review of systems negative except as stated above.    OBJECTIVE:  /78   Pulse 84   Temp 98  F (36.7  C) (Temporal)   Resp 16   Ht 1.816 m (5' 11.5\")   Wt 93 kg (205 lb)   SpO2 98%   BMI 28.19 kg/m    GENERAL APPEARANCE: healthy, alert and no distress  EYES: EOMI,  PERRL, conjunctiva clear  ENT-  no lip, mouth, throat swelling  NECK: supple, non-tender to palpation, no adenopathy noted  NECK: left side of the neck with 6 x 7 cm area of mild swelling, redness from insect sting  RESP: lungs clear to auscultation - no rales, rhonchi or wheezes  CV: regular rates and rhythm, normal S1 S2, no murmur noted        "

## 2023-08-28 ENCOUNTER — PATIENT OUTREACH (OUTPATIENT)
Dept: CARE COORDINATION | Facility: CLINIC | Age: 33
End: 2023-08-28
Payer: COMMERCIAL

## 2023-10-10 ASSESSMENT — ENCOUNTER SYMPTOMS
HEMATURIA: 0
PARESTHESIAS: 0
COUGH: 0
ABDOMINAL PAIN: 0
NERVOUS/ANXIOUS: 0
JOINT SWELLING: 0
FEVER: 0
HEMATOCHEZIA: 0
EYE PAIN: 0
CONSTIPATION: 0
DIZZINESS: 0
DIARRHEA: 0
PALPITATIONS: 0
SHORTNESS OF BREATH: 0
DYSURIA: 0
SORE THROAT: 0
HEARTBURN: 0
WEAKNESS: 0
MYALGIAS: 0
FREQUENCY: 0
CHILLS: 0
HEADACHES: 0
NAUSEA: 0
ARTHRALGIAS: 0

## 2023-10-11 ENCOUNTER — OFFICE VISIT (OUTPATIENT)
Dept: FAMILY MEDICINE | Facility: CLINIC | Age: 33
End: 2023-10-11
Payer: COMMERCIAL

## 2023-10-11 VITALS
HEART RATE: 93 BPM | HEIGHT: 71 IN | DIASTOLIC BLOOD PRESSURE: 81 MMHG | RESPIRATION RATE: 15 BRPM | SYSTOLIC BLOOD PRESSURE: 131 MMHG | OXYGEN SATURATION: 97 % | WEIGHT: 208 LBS | TEMPERATURE: 97.2 F | BODY MASS INDEX: 29.12 KG/M2

## 2023-10-11 DIAGNOSIS — Z13.220 SCREENING FOR HYPERLIPIDEMIA: ICD-10-CM

## 2023-10-11 DIAGNOSIS — F41.1 GAD (GENERALIZED ANXIETY DISORDER): ICD-10-CM

## 2023-10-11 DIAGNOSIS — Z23 HIGH PRIORITY FOR 2019-NCOV VACCINE: ICD-10-CM

## 2023-10-11 DIAGNOSIS — L20.9: ICD-10-CM

## 2023-10-11 DIAGNOSIS — Z00.00 ROUTINE GENERAL MEDICAL EXAMINATION AT A HEALTH CARE FACILITY: Primary | ICD-10-CM

## 2023-10-11 DIAGNOSIS — Z13.1 ENCOUNTER FOR SCREENING FOR DIABETES MELLITUS: ICD-10-CM

## 2023-10-11 LAB — HBA1C MFR BLD: 5.5 % (ref 0–5.6)

## 2023-10-11 PROCEDURE — 99395 PREV VISIT EST AGE 18-39: CPT | Mod: 25 | Performed by: NURSE PRACTITIONER

## 2023-10-11 PROCEDURE — 90471 IMMUNIZATION ADMIN: CPT | Performed by: NURSE PRACTITIONER

## 2023-10-11 PROCEDURE — 83036 HEMOGLOBIN GLYCOSYLATED A1C: CPT | Performed by: NURSE PRACTITIONER

## 2023-10-11 PROCEDURE — 99213 OFFICE O/P EST LOW 20 MIN: CPT | Mod: 25 | Performed by: NURSE PRACTITIONER

## 2023-10-11 PROCEDURE — 91320 SARSCV2 VAC 30MCG TRS-SUC IM: CPT | Mod: SL | Performed by: NURSE PRACTITIONER

## 2023-10-11 PROCEDURE — 90480 ADMN SARSCOV2 VAC 1/ONLY CMP: CPT | Performed by: NURSE PRACTITIONER

## 2023-10-11 PROCEDURE — 90686 IIV4 VACC NO PRSV 0.5 ML IM: CPT | Performed by: NURSE PRACTITIONER

## 2023-10-11 PROCEDURE — 80061 LIPID PANEL: CPT | Performed by: NURSE PRACTITIONER

## 2023-10-11 PROCEDURE — 36415 COLL VENOUS BLD VENIPUNCTURE: CPT | Performed by: NURSE PRACTITIONER

## 2023-10-11 PROCEDURE — 80048 BASIC METABOLIC PNL TOTAL CA: CPT | Performed by: NURSE PRACTITIONER

## 2023-10-11 RX ORDER — HYDROXYZINE HYDROCHLORIDE 25 MG/1
25 TABLET, FILM COATED ORAL 3 TIMES DAILY PRN
Qty: 90 TABLET | Refills: 1 | Status: SHIPPED | OUTPATIENT
Start: 2023-10-11

## 2023-10-11 RX ORDER — BUPROPION HYDROCHLORIDE 150 MG/1
150 TABLET, EXTENDED RELEASE ORAL 2 TIMES DAILY
Qty: 180 TABLET | Refills: 1 | Status: SHIPPED | OUTPATIENT
Start: 2023-10-11 | End: 2024-03-15

## 2023-10-11 RX ORDER — PROPRANOLOL HYDROCHLORIDE 20 MG/1
20-40 TABLET ORAL 3 TIMES DAILY PRN
Qty: 30 TABLET | Refills: 0 | Status: CANCELLED | OUTPATIENT
Start: 2023-10-11

## 2023-10-11 ASSESSMENT — ENCOUNTER SYMPTOMS
WEAKNESS: 0
SHORTNESS OF BREATH: 0
CONSTIPATION: 0
NAUSEA: 0
MYALGIAS: 0
EYE PAIN: 0
HEADACHES: 0
ABDOMINAL PAIN: 0
HEARTBURN: 0
ARTHRALGIAS: 0
DIARRHEA: 0
SORE THROAT: 0
NERVOUS/ANXIOUS: 0
PARESTHESIAS: 0
HEMATOCHEZIA: 0
DIZZINESS: 0
DYSURIA: 0
CHILLS: 0
HEMATURIA: 0
FREQUENCY: 0
FEVER: 0
COUGH: 0
PALPITATIONS: 0
JOINT SWELLING: 0

## 2023-10-11 ASSESSMENT — PAIN SCALES - GENERAL: PAINLEVEL: NO PAIN (0)

## 2023-10-11 NOTE — PROGRESS NOTES
SUBJECTIVE:   CC: AJ is an 33 year old who presents for preventative health visit.       10/11/2023     3:50 PM   Additional Questions   Roomed by Luzmajane Lew   33 year old year old male  with PMH   Patient Active Problem List   Diagnosis Code    Adjustment disorder with anxiety F43.22    Chronic dermatitis L30.9    Mitral regurgitation I34.0    Palpitations R00.2    in clinic for preventive health care exam.     In addition to the preventive visit, 0  minutes of the appointment were spent evaluating and developing a treatment plan for his additional concern(s).      Healthy Habits:     Getting at least 3 servings of Calcium per day:  Yes    Bi-annual eye exam:  NO    Dental care twice a year:  Yes    Sleep apnea or symptoms of sleep apnea:  None    Diet:  Regular (no restrictions)    Frequency of exercise:  4-5 days/week    Duration of exercise:  45-60 minutes    Taking medications regularly:  Yes    Medication side effects:  None    Additional concerns today:  Yes      Social History     Tobacco Use    Smoking status: Never     Passive exposure: Never    Smokeless tobacco: Never   Substance Use Topics    Alcohol use: Yes     Comment: Have reduced alcoholic drinks per week down to 2-3 from 8-10             10/10/2023    10:42 PM   Alcohol Use   Prescreen: >3 drinks/day or >7 drinks/week? No         2/18/2021     2:56 PM   AUDIT - Alcohol Use Disorders Identification Test - Reproduced from the World Health Organization Audit 2001 (Second Edition)   1.  How often do you have a drink containing alcohol? 4 or more times a week   2.  How many drinks containing alcohol do you have on a typical day when you are drinking? 1 or 2   3.  How often do you have five or more drinks on one occasion? Less than monthly   4.  How often during the last year have you found that you were not able to stop drinking once you had started? Less than monthly   5.  How often during the last year have you failed to do what was normally  "expected of you because of drinking? Less than monthly   6.  How often during the last year have you needed a first drink in the morning to get yourself going after a heavy drinking session? Never   7.  How often during the last year have you had a feeling of guilt or remorse after drinking? Less than monthly   8.  How often during the last year have you been unable to remember what happened the night before because of your drinking? Never   9.  Have you or someone else been injured because of your drinking? No   10. Has a relative, friend, doctor or other health care worker been concerned about your drinking or suggested you cut down? Yes, during the last year   TOTAL SCORE 12       Last PSA: No results found for: \"PSA\"    Reviewed orders with patient. Reviewed health maintenance and updated orders accordingly - Yes  Lab work is in process  Labs reviewed in EPIC    Reviewed and updated as needed this visit by clinical staff   Tobacco  Allergies  Meds  Problems  Med Hx  Surg Hx  Fam Hx          Reviewed and updated as needed this visit by Provider   Tobacco  Allergies  Meds  Problems  Med Hx  Surg Hx  Fam Hx         Past Medical History:   Diagnosis Date    Depressive disorder October 2016    Mild/Mod Depression and Anxiety in 2016/2017    Hypertension October 2014    Has recently been brought down to normal levels    Mitral regurgitation 12/16/2014        Review of Systems   Constitutional:  Negative for chills and fever.   HENT:  Negative for congestion, ear pain, hearing loss and sore throat.    Eyes:  Negative for pain and visual disturbance.   Respiratory:  Negative for cough and shortness of breath.    Cardiovascular:  Negative for chest pain, palpitations and peripheral edema.   Gastrointestinal:  Negative for abdominal pain, constipation, diarrhea, heartburn, hematochezia and nausea.   Genitourinary:  Positive for urgency. Negative for dysuria, frequency, genital sores, hematuria, impotence and " "penile discharge.   Musculoskeletal:  Negative for arthralgias, joint swelling and myalgias.   Skin:  Negative for rash.   Neurological:  Negative for dizziness, weakness, headaches and paresthesias.   Psychiatric/Behavioral:  Positive for mood changes. The patient is not nervous/anxious.          OBJECTIVE:   /81 (BP Location: Right arm, Patient Position: Sitting, Cuff Size: Adult Large)   Pulse 93   Temp 97.2  F (36.2  C) (Temporal)   Resp 15   Ht 1.8 m (5' 10.87\")   Wt 94.3 kg (208 lb)   SpO2 97%   BMI 29.12 kg/m      Physical Exam  GENERAL: healthy, alert and no distress  EYES: Eyes grossly normal to inspection, PERRL and conjunctivae and sclerae normal  HENT: ear canals and TM's normal, nose and mouth without ulcers or lesions  NECK: no adenopathy, no asymmetry, masses, or scars and thyroid normal to palpation  RESP: lungs clear to auscultation - no rales, rhonchi or wheezes  CV: regular rate and rhythm, normal S1 S2, no S3 or S4, no murmur, click or rub, no peripheral edema and peripheral pulses strong  ABDOMEN: soft, nontender, no hepatosplenomegaly, no masses and bowel sounds normal  MS: no gross musculoskeletal defects noted, no edema  SKIN: no suspicious lesions or rashes  NEURO: Normal strength and tone, mentation intact and speech normal  PSYCH: mentation appears normal, affect normal/bright    Diagnostic Test Results:  Labs reviewed in Epic    ASSESSMENT/PLAN:   RICHI was seen today for physical, medication request and imm/inj.    Diagnoses and all orders for this visit:    Routine general medical examination at a health care facility  Preventative exam w/no abnormalities and/or concerns listed in diagnoses; discussed health maintenance screenings including prostate, breast, cervical and colorectal ca screenings related to gender;  reviewed and reconciled medication, medical history and patient related health concerns  Plan: obtain metabolic labs    Encounter for screening for diabetes " "mellitus  -     Hemoglobin A1c; Future  -     Basic metabolic panel; Future  -     Hemoglobin A1c  -     Basic metabolic panel    Screening for hyperlipidemia  -     Lipid Profile; Future  -     Lipid Profile    MATTHEW (generalized anxiety disorder)  Stable; continue current regimen; renewed medication  -     buPROPion (WELLBUTRIN SR) 150 MG 12 hr tablet; Take 1 tablet (150 mg) by mouth 2 times daily    Chronic constitutional eczema  -     hydrOXYzine (ATARAX) 25 MG tablet; Take 1 tablet (25 mg) by mouth 3 times daily as needed for itching or anxiety    High priority for 2019-nCoV vaccine  Other orders  -     REVIEW OF HEALTH MAINTENANCE PROTOCOL ORDERS  -     INFLUENZA VACCINE IM > 6 MONTHS VALENT IIV4 (AFLURIA/FLUZONE)  -     PRIMARY CARE FOLLOW-UP SCHEDULING; Future  -     COVID-19 12+ (2023-24) (PFIZER)        Patient has been advised of split billing requirements and indicates understanding: Yes      COUNSELING:   Reviewed preventive health counseling, as reflected in patient instructions       Regular exercise       Healthy diet/nutrition       Family planning      BMI:   Estimated body mass index is 29.12 kg/m  as calculated from the following:    Height as of this encounter: 1.8 m (5' 10.87\").    Weight as of this encounter: 94.3 kg (208 lb).         He reports that he has never smoked. He has never been exposed to tobacco smoke. He has never used smokeless tobacco.            DAT Kraus CNP  [facility]  "

## 2023-10-12 LAB
ANION GAP SERPL CALCULATED.3IONS-SCNC: 13 MMOL/L (ref 7–15)
BUN SERPL-MCNC: 23.8 MG/DL (ref 6–20)
CALCIUM SERPL-MCNC: 9.9 MG/DL (ref 8.6–10)
CHLORIDE SERPL-SCNC: 99 MMOL/L (ref 98–107)
CHOLEST SERPL-MCNC: 201 MG/DL
CREAT SERPL-MCNC: 1.17 MG/DL (ref 0.67–1.17)
DEPRECATED HCO3 PLAS-SCNC: 25 MMOL/L (ref 22–29)
EGFRCR SERPLBLD CKD-EPI 2021: 85 ML/MIN/1.73M2
GLUCOSE SERPL-MCNC: 75 MG/DL (ref 70–99)
HDLC SERPL-MCNC: 50 MG/DL
LDLC SERPL CALC-MCNC: 126 MG/DL
NONHDLC SERPL-MCNC: 151 MG/DL
POTASSIUM SERPL-SCNC: 4.7 MMOL/L (ref 3.4–5.3)
SODIUM SERPL-SCNC: 137 MMOL/L (ref 135–145)
TRIGL SERPL-MCNC: 127 MG/DL

## 2023-10-14 ENCOUNTER — HEALTH MAINTENANCE LETTER (OUTPATIENT)
Age: 33
End: 2023-10-14

## 2023-10-19 ENCOUNTER — MYC MEDICAL ADVICE (OUTPATIENT)
Dept: FAMILY MEDICINE | Facility: CLINIC | Age: 33
End: 2023-10-19

## 2023-10-19 ENCOUNTER — OFFICE VISIT (OUTPATIENT)
Dept: DERMATOLOGY | Facility: CLINIC | Age: 33
End: 2023-10-19
Payer: COMMERCIAL

## 2023-10-19 DIAGNOSIS — L20.81 ATOPIC NEURODERMATITIS: ICD-10-CM

## 2023-10-19 DIAGNOSIS — Z51.81 THERAPEUTIC DRUG MONITORING: Primary | ICD-10-CM

## 2023-10-19 DIAGNOSIS — L73.9 FOLLICULITIS: ICD-10-CM

## 2023-10-19 LAB
ALBUMIN SERPL BCG-MCNC: 5 G/DL (ref 3.5–5.2)
ALP SERPL-CCNC: 95 U/L (ref 40–129)
ALT SERPL W P-5'-P-CCNC: 32 U/L (ref 0–70)
ANION GAP SERPL CALCULATED.3IONS-SCNC: 13 MMOL/L (ref 7–15)
AST SERPL W P-5'-P-CCNC: 24 U/L (ref 0–45)
BILIRUB SERPL-MCNC: 0.5 MG/DL
BUN SERPL-MCNC: 15.7 MG/DL (ref 6–20)
CALCIUM SERPL-MCNC: 10 MG/DL (ref 8.6–10)
CHLORIDE SERPL-SCNC: 102 MMOL/L (ref 98–107)
CREAT SERPL-MCNC: 1.06 MG/DL (ref 0.67–1.17)
DEPRECATED HCO3 PLAS-SCNC: 24 MMOL/L (ref 22–29)
EGFRCR SERPLBLD CKD-EPI 2021: >90 ML/MIN/1.73M2
ERYTHROCYTE [DISTWIDTH] IN BLOOD BY AUTOMATED COUNT: 12.4 % (ref 10–15)
GLUCOSE SERPL-MCNC: 78 MG/DL (ref 70–99)
HBV SURFACE AG SERPL QL IA: NONREACTIVE
HCT VFR BLD AUTO: 47.2 % (ref 40–53)
HCV AB SERPL QL IA: NONREACTIVE
HGB BLD-MCNC: 15.5 G/DL (ref 13.3–17.7)
MCH RBC QN AUTO: 28.4 PG (ref 26.5–33)
MCHC RBC AUTO-ENTMCNC: 32.8 G/DL (ref 31.5–36.5)
MCV RBC AUTO: 87 FL (ref 78–100)
PLATELET # BLD AUTO: 296 10E3/UL (ref 150–450)
POTASSIUM SERPL-SCNC: 4.4 MMOL/L (ref 3.4–5.3)
PROT SERPL-MCNC: 7.5 G/DL (ref 6.4–8.3)
RBC # BLD AUTO: 5.45 10E6/UL (ref 4.4–5.9)
SODIUM SERPL-SCNC: 139 MMOL/L (ref 135–145)
WBC # BLD AUTO: 7.4 10E3/UL (ref 4–11)

## 2023-10-19 PROCEDURE — 36415 COLL VENOUS BLD VENIPUNCTURE: CPT | Performed by: PHYSICIAN ASSISTANT

## 2023-10-19 PROCEDURE — 86803 HEPATITIS C AB TEST: CPT | Performed by: PHYSICIAN ASSISTANT

## 2023-10-19 PROCEDURE — 85027 COMPLETE CBC AUTOMATED: CPT | Performed by: PHYSICIAN ASSISTANT

## 2023-10-19 PROCEDURE — 86481 TB AG RESPONSE T-CELL SUSP: CPT | Performed by: PHYSICIAN ASSISTANT

## 2023-10-19 PROCEDURE — 99214 OFFICE O/P EST MOD 30 MIN: CPT | Performed by: PHYSICIAN ASSISTANT

## 2023-10-19 PROCEDURE — 80053 COMPREHEN METABOLIC PANEL: CPT | Performed by: PHYSICIAN ASSISTANT

## 2023-10-19 PROCEDURE — 87340 HEPATITIS B SURFACE AG IA: CPT | Performed by: PHYSICIAN ASSISTANT

## 2023-10-19 RX ORDER — DUPILUMAB 300 MG/2ML
300 INJECTION, SOLUTION SUBCUTANEOUS
Qty: 14 ML | Refills: 1 | Status: SHIPPED | OUTPATIENT
Start: 2023-10-19 | End: 2024-02-26

## 2023-10-19 RX ORDER — CLINDAMYCIN PHOSPHATE 10 MG/G
GEL TOPICAL
Qty: 60 G | Refills: 3 | Status: SHIPPED | OUTPATIENT
Start: 2023-10-19 | End: 2024-09-11

## 2023-10-19 ASSESSMENT — PAIN SCALES - GENERAL: PAINLEVEL: MODERATE PAIN (4)

## 2023-10-19 NOTE — PROGRESS NOTES
HPI:   Chief complaints: Jeffy Silva is a pleasant 33 year old male who presents for evaluation of eczema. He has had eczema on and off for many years but it has been worse for the past 6 months. He developed itching and a rash on the backs of both hands, both forearms and both lower legs. He is just now finishing a prednisone taper which has helped his eczema but he has different red, tender pustules on the lower legs. In the past he has tried topical steroids and tacrolimus, mometasone, TAC and betamethasone. These help a little but recently have not been clearing him.     Shx: lives in [facility]; from [place] originally      PHYSICAL EXAM:    There were no vitals taken for this visit.  Skin exam performed as follows: Type 2 skin. Mood appropriate  Alert and Oriented X 3. Well developed, well nourished in no distress.  General appearance: Normal  Head including face: Normal  Eyes: conjunctiva and lids: Normal  Mouth: Lips, teeth, gums: Normal  Neck: Normal  Skin: Scalp and body hair: See below    Xerosis and eczematous dermatitis on the arms, hands and legs  Pustules on bilateral lower legs    ASSESSMENT/PLAN:     Atopic neurodermatitis with secondary folliculitis - discussed diagnosis and treatment options. Discussed Dupixent and he would like to try this.   --Check CBC, CMP, hep panel and quant gold today  --Start Dupixent  --Start clindamycin gel BID until pustules are clear  --Topical steroids as needed          Follow-up: 3-4 months  CC:   Scribed By: Coretta Rosario, MS, PA-C

## 2023-10-19 NOTE — NURSING NOTE
Jeffy Silva's chief complaint for this visit includes:  Chief Complaint   Patient presents with    Derm Problem     X 2 weeks patient noticed dry skin and thought it was eczema flare up and now is having painful lesions on shin and buttocks. Patient rates pain at a 4/10. Patient is not using anything at this time.      PCP: Denise Garrido    Referring Provider:  Referred Self, MD  No address on file    There were no vitals taken for this visit.  Moderate Pain (4)      No Known Allergies      Do you need any medication refills at today's visit? No    Sandhya Rios MA

## 2023-10-19 NOTE — TELEPHONE ENCOUNTER
Message sent via [employer].    Adelina Zabala, RN, BSN, PHN  [facility]  [phone]

## 2023-10-19 NOTE — LETTER
10/19/2023         RE: Jeffy Silva  [address]  [place] [address]        Dear Colleague,    Thank you for referring your patient, Jeffy Silva, to the [facility]. Please see a copy of my visit note below.    HPI:   Chief complaints: Jeffy Silva is a pleasant 33 year old male who presents for evaluation of eczema. He has had eczema on and off for many years but it has been worse for the past 6 months. He developed itching and a rash on the backs of both hands, both forearms and both lower legs. He is just now finishing a prednisone taper which has helped his eczema but he has different red, tender pustules on the lower legs. In the past he has tried topical steroids and tacrolimus, mometasone, TAC and betamethasone. These help a little but recently have not been clearing him.     Shx: lives in [place]; from [place] originally      PHYSICAL EXAM:    There were no vitals taken for this visit.  Skin exam performed as follows: Type 2 skin. Mood appropriate  Alert and Oriented X 3. Well developed, well nourished in no distress.  General appearance: Normal  Head including face: Normal  Eyes: conjunctiva and lids: Normal  Mouth: Lips, teeth, gums: Normal  Neck: Normal  Skin: Scalp and body hair: See below    Xerosis and eczematous dermatitis on the arms, hands and legs  Pustules on bilateral lower legs    ASSESSMENT/PLAN:     Atopic neurodermatitis with secondary folliculitis - discussed diagnosis and treatment options. Discussed Dupixent and he would like to try this.   --Check CBC, CMP, hep panel and quant gold today  --Start Dupixent  --Start clindamycin gel BID until pustules are clear  --Topical steroids as needed          Follow-up: 3-4 months  CC:   Scribed By: Coretta Rosario, MS, PALEO      Again, thank you for allowing me to participate in the care of your patient.        Sincerely,        Coretta Rosario PA-C

## 2023-10-20 ENCOUNTER — TELEPHONE (OUTPATIENT)
Dept: DERMATOLOGY | Facility: CLINIC | Age: 33
End: 2023-10-20
Payer: COMMERCIAL

## 2023-10-20 LAB
GAMMA INTERFERON BACKGROUND BLD IA-ACNC: 0 IU/ML
M TB IFN-G BLD-IMP: NEGATIVE
M TB IFN-G CD4+ BCKGRND COR BLD-ACNC: 10 IU/ML
MITOGEN IGNF BCKGRD COR BLD-ACNC: 0 IU/ML
MITOGEN IGNF BCKGRD COR BLD-ACNC: 0.01 IU/ML
QUANTIFERON MITOGEN: 10 IU/ML
QUANTIFERON NIL TUBE: 0 IU/ML
QUANTIFERON TB1 TUBE: 0 IU/ML
QUANTIFERON TB2 TUBE: 0.01

## 2023-10-25 NOTE — TELEPHONE ENCOUNTER
Prior Authorization Approval    Medication: DUPIXENT 300 MG/2ML SC SOPN  Authorization Effective Date: 10/20/2023  Authorization Expiration Date: 4/20/2024  Approved Dose/Quantity: 6ml per 28 days  Reference #: [id number]   Insurance Company: [employer] ([facility]) - Phone [phone] Fax [phone]  Expected CoPay: $ 30  CoPay Card Available: No    Financial Assistance Needed: NA [facility] plan  Which Pharmacy is filling the prescription: [facility] - [address]  Pharmacy Notified: yes  Patient Notified: yes via aminata Richardson CphT  [facility] [email or web address]  Phone: [phone]  Fax: [phone]

## 2024-01-02 ENCOUNTER — TELEPHONE (OUTPATIENT)
Dept: DERMATOLOGY | Facility: CLINIC | Age: 34
End: 2024-01-02
Payer: COMMERCIAL

## 2024-01-02 NOTE — TELEPHONE ENCOUNTER
PA Needed    Medication: DUPIXENT 300MG/2ML PENS  QTY/DS: 4 FOR 28 DAYS  NEW INS: YES  Insurance Company: [employer] - Phone [phone] Fax [phone]  Pharmacy Filling the Rx: [facility] - [place], [place] - [address]  PA : N/A  Date of last fill: N/A

## 2024-01-02 NOTE — TELEPHONE ENCOUNTER
PA Initiation    Medication: DUPIXENT 300 MG/2ML SC SOPN  Insurance Company: [employer] - Phone [phone] Fax [phone]  Pharmacy Filling the Rx: [facility] - [place], [place] - [facility] [address]  Filling Pharmacy Phone: [phone]  Filling Pharmacy Fax: [phone]  Start Date: 1/2/2024    [email or web address]         Emilia Richardson Cp[facility] [email or web address]  Phone: [phone]  Fax: [phone]

## 2024-01-04 NOTE — TELEPHONE ENCOUNTER
Prior Authorization Approval    Medication: DUPIXENT 300 MG/2ML SC SOPN  Authorization Effective Date: 1/2/2024  Authorization Expiration Date: 1/2/2025  Approved Dose/Quantity: 4ml per 28 days  Reference #: [id number]   Insurance Company: [employer] - Phone [phone] Fax [phone]  Expected CoPay: $    CoPay Card Available:      Financial Assistance Needed: NA  Which Pharmacy is filling the prescription: [facility] - [place] [address]   Pharmacy Notified: yes  Patient Notified:         Emilia Richardson CphT  [facility] [email or web address]  Phone: [phone]  Fax: [phone]

## 2024-02-26 DIAGNOSIS — L20.81 ATOPIC NEURODERMATITIS: ICD-10-CM

## 2024-02-26 NOTE — TELEPHONE ENCOUNTER
Requested Prescriptions   Pending Prescriptions Disp Refills    dupilumab (DUPIXENT) 300 MG/2ML prefilled pen 14 mL 1     Sig: Inject 2 mLs (300 mg) Subcutaneous every 14 days 600 mg subcutaneous week 0, then 300 mg every 2 weeks starting week 2.       There is no refill protocol information for this order         Last Written Prescription Date:  10/19/2023  Last Fill Quantity:  14 mL ,  # refills: 1   Last office visit: 10/19/2023  with prescribing provider:  10/19/23   Future Office Visit:  3/5/24        Gladys ANDRADE,  CMA

## 2024-02-27 NOTE — TELEPHONE ENCOUNTER
Needs to be seen. Has scheduled and canceled appts x 4 and is now scheduled again for follow up on 3-5-24 at [facility].     Leona Srivastava RN

## 2024-02-29 RX ORDER — DUPILUMAB 300 MG/2ML
300 INJECTION, SOLUTION SUBCUTANEOUS
Qty: 14 ML | Refills: 0 | Status: SHIPPED | OUTPATIENT
Start: 2024-02-29

## 2024-03-05 ENCOUNTER — OFFICE VISIT (OUTPATIENT)
Dept: DERMATOLOGY | Facility: CLINIC | Age: 34
End: 2024-03-05
Payer: COMMERCIAL

## 2024-03-05 DIAGNOSIS — L20.9 ATOPIC DERMATITIS, UNSPECIFIED TYPE: Primary | ICD-10-CM

## 2024-03-05 PROCEDURE — 99213 OFFICE O/P EST LOW 20 MIN: CPT | Performed by: PHYSICIAN ASSISTANT

## 2024-03-05 RX ORDER — DUPILUMAB 300 MG/2ML
300 INJECTION, SOLUTION SUBCUTANEOUS
Qty: 2 ML | Refills: 0 | Status: SHIPPED | OUTPATIENT
Start: 2024-03-05 | End: 2024-03-07

## 2024-03-05 ASSESSMENT — PAIN SCALES - GENERAL: PAINLEVEL: NO PAIN (0)

## 2024-03-05 NOTE — LETTER
3/5/2024         RE: Jeffy Silva  [address]  [place] [address]        Dear Colleague,    Thank you for referring your patient, Jeffy Silva, to the [facility]. Please see a copy of my visit note below.    HPI:   Chief complaints: Jeffy Silva is a pleasant 33 year old male who presents for recheck atopic dermatitis. He reports he is doing very well on Dupixent.  The injections are painful for him but no other adverse effects. In the past he has failed topical steroids and tacrolimus, mometasone, TAC and betamethasone.     Shx: lives in [facility]; from [place] originally. Wife is an nicu NP      PHYSICAL EXAM:    There were no vitals taken for this visit.  Skin exam performed as follows: Type 2 skin. Mood appropriate  Alert and Oriented X 3. Well developed, well nourished in no distress.  General appearance: Normal  Head including face: Normal  Eyes: conjunctiva and lids: Normal  Mouth: Lips, teeth, gums: Normal  Neck: Normal  Skin: Scalp and body hair: See below    Skin clear    ASSESSMENT/PLAN:     Atopic neurodermatitis - doing great on current regimen. He would like to try the syringe to see if this is less painful than the pen. discussed diagnosis and treatment options.     --Continue Dupixent  --Topical steroids as needed          Follow-up: yearly  CC:   Scribed By: Coretta Rosario MS, PALEO      Again, thank you for allowing me to participate in the care of your patient.        Sincerely,        Coretta Rosario PA-C

## 2024-03-05 NOTE — PROGRESS NOTES
HPI:   Chief complaints: Jeffy Silva is a pleasant 33 year old male who presents for recheck atopic dermatitis. He reports he is doing very well on Dupixent.  The injections are painful for him but no other adverse effects. In the past he has failed topical steroids and tacrolimus, mometasone, TAC and betamethasone.     Shx: lives in [facility]; from [place] originally. Wife is an nicu NP      PHYSICAL EXAM:    There were no vitals taken for this visit.  Skin exam performed as follows: Type 2 skin. Mood appropriate  Alert and Oriented X 3. Well developed, well nourished in no distress.  General appearance: Normal  Head including face: Normal  Eyes: conjunctiva and lids: Normal  Mouth: Lips, teeth, gums: Normal  Neck: Normal  Skin: Scalp and body hair: See below    Skin clear    ASSESSMENT/PLAN:     Atopic neurodermatitis - doing great on current regimen. He would like to try the syringe to see if this is less painful than the pen. discussed diagnosis and treatment options.     --Continue Dupixent  --Topical steroids as needed          Follow-up: yearly  CC:   Scribed By: Coretta Rosario, MS, PA-C

## 2024-03-07 ENCOUNTER — TELEPHONE (OUTPATIENT)
Dept: DERMATOLOGY | Facility: CLINIC | Age: 34
End: 2024-03-07
Payer: COMMERCIAL

## 2024-03-07 DIAGNOSIS — L20.9 ATOPIC DERMATITIS, UNSPECIFIED TYPE: ICD-10-CM

## 2024-03-07 RX ORDER — DUPILUMAB 300 MG/2ML
300 INJECTION, SOLUTION SUBCUTANEOUS
Qty: 4 ML | Refills: 11 | Status: SHIPPED | OUTPATIENT
Start: 2024-03-07

## 2024-03-07 NOTE — TELEPHONE ENCOUNTER
Pharmacy wanting to know if Derm provider wants to increase Dupixent to a 1 month supply (Disp 4 mL) and include refills?      Direct line to Pharmacist for callback: [phone]

## 2024-03-07 NOTE — TELEPHONE ENCOUNTER
MTM referral from: [facility] visit (referral by provider)    MTM referral outreach attempt #2 on March 7, 2024 at 4:05 PM      Outcome: Patient is not interested at this time because he thought he would be getting a new prescription for the self injectable one, he didn't think he'd need an appointment. He'll stick to what he has now, will route to [facility] Pharmacist/Provider as an FYI. Thank you for the referral.     Use hbc for the carrier/Plan on the flowsheet        Sandeep Morejon [facility]

## 2024-03-15 DIAGNOSIS — F41.1 GAD (GENERALIZED ANXIETY DISORDER): ICD-10-CM

## 2024-03-15 RX ORDER — BUPROPION HYDROCHLORIDE 150 MG/1
150 TABLET, EXTENDED RELEASE ORAL 2 TIMES DAILY
Qty: 180 TABLET | Refills: 1 | Status: SHIPPED | OUTPATIENT
Start: 2024-03-15 | End: 2024-09-11

## 2024-03-15 NOTE — TELEPHONE ENCOUNTER
Medication Question or Refill        What medication are you calling about (include dose and sig)?:   buPROPion (WELLBUTRIN SR) 150 MG 12 hr tablet     Preferred Pharmacy:     [facility] [address] [place] [address] [address] [facility]  [address] [facility] [address]  Phone: [phone] Fax: [phone]    Controlled Substance Agreement on file:   CSA -- Patient Level:    CSA: None found at the patient level.       Who prescribed the medication?: PCP    Do you need a refill? Yes    When did you use the medication last? NA    Patient offered an appointment? No    Do you have any questions or concerns?  Yes: Pt is currently out of town, pt forgot to bring RX. Pt would like a short supply for a week till pt comes back home.

## 2024-04-30 ENCOUNTER — OFFICE VISIT (OUTPATIENT)
Dept: URGENT CARE | Facility: URGENT CARE | Age: 34
End: 2024-04-30
Payer: COMMERCIAL

## 2024-04-30 VITALS
RESPIRATION RATE: 16 BRPM | HEART RATE: 64 BPM | DIASTOLIC BLOOD PRESSURE: 81 MMHG | TEMPERATURE: 97.7 F | SYSTOLIC BLOOD PRESSURE: 145 MMHG | BODY MASS INDEX: 28.84 KG/M2 | HEIGHT: 71 IN | WEIGHT: 206 LBS | OXYGEN SATURATION: 98 %

## 2024-04-30 DIAGNOSIS — S61.511A LACERATION OF RIGHT WRIST, INITIAL ENCOUNTER: Primary | ICD-10-CM

## 2024-04-30 PROCEDURE — 12001 RPR S/N/AX/GEN/TRNK 2.5CM/<: CPT | Performed by: STUDENT IN AN ORGANIZED HEALTH CARE EDUCATION/TRAINING PROGRAM

## 2024-04-30 NOTE — PROGRESS NOTES
ASSESSMENT & PLAN:   Diagnoses and all orders for this visit:  Laceration of right wrist, initial encounter  -     REPAIR SUPERFICIAL, WOUND BODY < =2.5CM    Laceration to right anterior wrist closed with 3 simple interrupted sutures. Tdap UTD. Suture removal in 7 days.    At the end of the encounter, I discussed results, diagnosis, medications. Discussed red flags for immediate return to clinic/ER, as well as indications for follow up if no improvement. Patient and/or caregiver understood and agreed to plan. Patient was stable for discharge.    Patient Instructions   Your wound was closed with sutures today.  We placed 3 sutures.   Your tetanus is up-to-date today.   Perform dressing changes daily.  Monitor for signs of infection including:  Spreading redness around wound.  Increased swelling.  Pain/increased tenderness.  Discharge that looks thick or discolored (pus).  Red streaks coming up the limb from the wound.  Fevers, chills, nausea, vomiting.     Return to clinic in 7 days for suture removal.       Return in 1 week (on 5/7/2024) for suture removal.    ------------------------------------------------------------------------  SUBJECTIVE  History was obtained from patient.    Patient presents with:  Laceration: Laceration on right wrist from metal happened around noon   Tried to super glue it   Urgent Care: Last tdap was in 2021     HPI  Jeffy Silva is a(n) 33 year old male presenting to urgent care for laceration to right wrist that occurred earlier today. He cut his wrist on a piece of metal while fixing a dryer. Denies numbness or tingling in the hand. Last tdap 2021.    Review of Systems    Current Outpatient Medications   Medication Sig Dispense Refill    buPROPion (WELLBUTRIN SR) 150 MG 12 hr tablet Take 1 tablet (150 mg) by mouth 2 times daily 180 tablet 1    dupilumab (DUPIXENT) 300 MG/2ML prefilled pen Inject 2 mLs (300 mg) Subcutaneous every 14 days 14 mL 0    dupilumab (DUPIXENT) 300 MG/2ML  "prefilled syringe Inject 2 mLs (300 mg) Subcutaneous every 14 days 4 mL 11    EPINEPHrine (ANY BX GENERIC EQUIV) 0.3 MG/0.3ML injection 2-pack Inject 0.3 mLs (0.3 mg) into the muscle as needed for anaphylaxis May repeat one time in 5-15 minutes if response to initial dose is inadequate. 2 each 1    cetirizine (ZYRTEC) 10 MG tablet Take 1 tablet (10 mg) by mouth daily (Patient not taking: Reported on 4/30/2024) 30 tablet 1    clindamycin (CLINDAMAX) 1 % external gel Apply to AA BID until resolved (Patient not taking: Reported on 3/5/2024) 60 g 3    diphenhydrAMINE (BENADRYL) 25 MG tablet Take 1 tablet (25 mg) by mouth At Bedtime (Patient not taking: Reported on 4/30/2024) 30 tablet 1    hydrOXYzine (ATARAX) 25 MG tablet Take 1 tablet (25 mg) by mouth 3 times daily as needed for itching or anxiety (Patient not taking: Reported on 3/5/2024) 90 tablet 1    melatonin 5 MG tablet Take 5 mg by mouth nightly as needed for sleep      propranolol (INDERAL) 20 MG tablet Take 1-2 tablets (20-40 mg) by mouth 3 times daily as needed (anxiety) (Patient not taking: Reported on 4/30/2024) 30 tablet 0    triamcinolone (KENALOG) 0.1 % external cream Apply topically 2 times daily To rash on body and extremities as needed (Patient not taking: Reported on 3/5/2024) 80 g 2     Problem List:  2022-10: Pain of left upper extremity  2022-07: Chronic dermatitis  2021-05: Adjustment disorder with anxiety  2014-12: Palpitations  2014-12: Mitral regurgitation    No Known Allergies      OBJECTIVE  Vitals:    04/30/24 1757   BP: (!) 145/81   Pulse: 64   Resp: 16   Temp: 97.7  F (36.5  C)   TempSrc: Temporal   SpO2: 98%   Weight: 93.4 kg (206 lb)   Height: 1.803 m (5' 11\")     Physical Exam   GENERAL: healthy, alert, no acute distress.   PSYCH: mentation appears normal. Normal affect    LACERATION EXAM:   Size of laceration: 2 cm  Location: ulnar aspect of anterior wrist  Characteristics of the laceration: clean wound edges, slightly gaping  Depth " of laceration: superficial   Tendon function intact: Yes   Sensation to light touch intact: Yes   Pulses/capillary refill intact: Yes   Foreign body: No     PROCEDURE NOTE:  Anesthesia: 2% lidocaine with epi  Prepped and draped in the usual sterile fashion  Wound irrigated with sterile water  Wound was explored for any foreign bodies and evaluated for tendon, nerve, vessel or joint involvement.    Closure was simple  Laceration was closed with 3 simple interrupted sutures with 4-0 Ethilon  Dressed with bacitracin and band-aid  Patient tolerated the procedure well      No results found for any visits on 04/30/24.

## 2024-05-01 NOTE — PATIENT INSTRUCTIONS
Your wound was closed with sutures today.  We placed 3 sutures.   Your tetanus is up-to-date today.   Perform dressing changes daily.  Monitor for signs of infection including:  Spreading redness around wound.  Increased swelling.  Pain/increased tenderness.  Discharge that looks thick or discolored (pus).  Red streaks coming up the limb from the wound.  Fevers, chills, nausea, vomiting.     Return to clinic in 7 days for suture removal.

## 2024-06-01 DIAGNOSIS — F33.0 MILD EPISODE OF RECURRENT MAJOR DEPRESSIVE DISORDER (H): ICD-10-CM

## 2024-06-03 RX ORDER — BUPROPION HYDROCHLORIDE 150 MG/1
150 TABLET ORAL EVERY MORNING
Qty: 60 TABLET | Refills: 0 | OUTPATIENT
Start: 2024-06-03

## 2024-09-10 ASSESSMENT — PATIENT HEALTH QUESTIONNAIRE - PHQ9
10. IF YOU CHECKED OFF ANY PROBLEMS, HOW DIFFICULT HAVE THESE PROBLEMS MADE IT FOR YOU TO DO YOUR WORK, TAKE CARE OF THINGS AT HOME, OR GET ALONG WITH OTHER PEOPLE: NOT DIFFICULT AT ALL
SUM OF ALL RESPONSES TO PHQ QUESTIONS 1-9: 4
SUM OF ALL RESPONSES TO PHQ QUESTIONS 1-9: 4

## 2024-09-11 ENCOUNTER — PATIENT OUTREACH (OUTPATIENT)
Dept: CARE COORDINATION | Facility: CLINIC | Age: 34
End: 2024-09-11

## 2024-09-11 ENCOUNTER — OFFICE VISIT (OUTPATIENT)
Dept: FAMILY MEDICINE | Facility: CLINIC | Age: 34
End: 2024-09-11
Payer: COMMERCIAL

## 2024-09-11 VITALS
OXYGEN SATURATION: 99 % | WEIGHT: 200.4 LBS | SYSTOLIC BLOOD PRESSURE: 137 MMHG | TEMPERATURE: 97.3 F | HEIGHT: 71 IN | BODY MASS INDEX: 28.06 KG/M2 | DIASTOLIC BLOOD PRESSURE: 84 MMHG | HEART RATE: 63 BPM | RESPIRATION RATE: 15 BRPM

## 2024-09-11 DIAGNOSIS — F33.8 SEASONAL AFFECTIVE DISORDER (H): ICD-10-CM

## 2024-09-11 DIAGNOSIS — F33.0 MILD EPISODE OF RECURRENT MAJOR DEPRESSIVE DISORDER (H): ICD-10-CM

## 2024-09-11 DIAGNOSIS — F41.1 GAD (GENERALIZED ANXIETY DISORDER): ICD-10-CM

## 2024-09-11 DIAGNOSIS — Z13.39 ADHD (ATTENTION DEFICIT HYPERACTIVITY DISORDER) EVALUATION: Primary | ICD-10-CM

## 2024-09-11 PROCEDURE — G2211 COMPLEX E/M VISIT ADD ON: HCPCS | Performed by: FAMILY MEDICINE

## 2024-09-11 PROCEDURE — 99213 OFFICE O/P EST LOW 20 MIN: CPT | Performed by: FAMILY MEDICINE

## 2024-09-11 ASSESSMENT — PAIN SCALES - GENERAL: PAINLEVEL: NO PAIN (0)

## 2024-09-11 ASSESSMENT — ANXIETY QUESTIONNAIRES
8. IF YOU CHECKED OFF ANY PROBLEMS, HOW DIFFICULT HAVE THESE MADE IT FOR YOU TO DO YOUR WORK, TAKE CARE OF THINGS AT HOME, OR GET ALONG WITH OTHER PEOPLE?: SOMEWHAT DIFFICULT
7. FEELING AFRAID AS IF SOMETHING AWFUL MIGHT HAPPEN: SEVERAL DAYS
GAD7 TOTAL SCORE: 5

## 2024-09-11 NOTE — PROGRESS NOTES
"  Assessment & Plan     ADHD (attention deficit hyperactivity disorder) evaluation  - Adult Mental Health  Referral; Future    Seasonal affective disorder (H24)  Mild episode of recurrent major depressive disorder (H24)  MATTHEW (generalized anxiety disorder)  - Stopped Wellbutrin May 2024. Medication worked well and will inform me if he is interested in restarting in one month for SAD. We did discuss that Wellbutrin has off label use for ADHD and might interfere with testing.     BMI  Estimated body mass index is 27.95 kg/m  as calculated from the following:    Height as of this encounter: 1.803 m (5' 11\").    Weight as of this encounter: 90.9 kg (200 lb 6.4 oz).             Subjective   RICHI is a 33 year old, presenting for the following health issues:  RECHECK (Mental health, ADHD testing and medication)        9/11/2024     8:22 AM   Additional Questions   Roomed by Catrina DO     History of Present Illness       Reason for visit:  Physical evaluation and discuss possible symptoms of ADHD/OCD.  Symptom onset:  More than a month  Symptoms include:  Trouble completing tasks and sitting still, difficulty remembering what was said only minutes ago, fidgety.  Symptom intensity:  Moderate  Symptom progression:  Staying the same  Had these symptoms before:  Yes  Has tried/received treatment for these symptoms:  No  What makes it worse:  No  What makes it better:  Exercise   He is taking medications regularly.       Concern about ADHD. He had good grades. He has had fidgety, hard time focusing on things which are short quick tasks and impulsivity. Organization depends on what he is working on. At work he is organized but organizing other stuff he let things pile up. He has also struggled with anxiety in the past. He was previously on MATTHEW/MDD and weaned off medication. He is sometimes on medication for seasonal affective disorder. He feels that initial symptoms would improve with medication but not with above symptoms. " "He stopped Wellbutrin May 2024. Mood wise doing well overall.           Objective    /84 (BP Location: Right arm, Patient Position: Sitting, Cuff Size: Adult Regular)   Pulse 63   Temp 97.3  F (36.3  C) (Temporal)   Resp 15   Ht 1.803 m (5' 11\")   Wt 90.9 kg (200 lb 6.4 oz)   SpO2 99%   BMI 27.95 kg/m    Body mass index is 27.95 kg/m .  Physical Exam               Signed Electronically by: Benito Peguero MD    "

## 2024-09-25 ENCOUNTER — PATIENT OUTREACH (OUTPATIENT)
Dept: CARE COORDINATION | Facility: CLINIC | Age: 34
End: 2024-09-25
Payer: COMMERCIAL

## 2024-12-05 SDOH — HEALTH STABILITY: PHYSICAL HEALTH: ON AVERAGE, HOW MANY MINUTES DO YOU ENGAGE IN EXERCISE AT THIS LEVEL?: 40 MIN

## 2024-12-05 SDOH — HEALTH STABILITY: PHYSICAL HEALTH: ON AVERAGE, HOW MANY DAYS PER WEEK DO YOU ENGAGE IN MODERATE TO STRENUOUS EXERCISE (LIKE A BRISK WALK)?: 6 DAYS

## 2024-12-05 ASSESSMENT — SOCIAL DETERMINANTS OF HEALTH (SDOH): HOW OFTEN DO YOU GET TOGETHER WITH FRIENDS OR RELATIVES?: ONCE A WEEK

## 2024-12-10 ENCOUNTER — OFFICE VISIT (OUTPATIENT)
Dept: FAMILY MEDICINE | Facility: CLINIC | Age: 34
End: 2024-12-10
Payer: COMMERCIAL

## 2024-12-10 VITALS
OXYGEN SATURATION: 99 % | BODY MASS INDEX: 28.53 KG/M2 | TEMPERATURE: 97.4 F | WEIGHT: 203.8 LBS | DIASTOLIC BLOOD PRESSURE: 83 MMHG | HEIGHT: 71 IN | HEART RATE: 74 BPM | SYSTOLIC BLOOD PRESSURE: 130 MMHG | RESPIRATION RATE: 18 BRPM

## 2024-12-10 DIAGNOSIS — Z00.00 ROUTINE GENERAL MEDICAL EXAMINATION AT A HEALTH CARE FACILITY: Primary | ICD-10-CM

## 2024-12-10 DIAGNOSIS — F33.8 SEASONAL AFFECTIVE DISORDER (H): ICD-10-CM

## 2024-12-10 LAB
ANION GAP SERPL CALCULATED.3IONS-SCNC: 10 MMOL/L (ref 7–15)
BUN SERPL-MCNC: 20 MG/DL (ref 6–20)
CALCIUM SERPL-MCNC: 9.5 MG/DL (ref 8.8–10.4)
CHLORIDE SERPL-SCNC: 103 MMOL/L (ref 98–107)
CHOLEST SERPL-MCNC: 228 MG/DL
CREAT SERPL-MCNC: 1.04 MG/DL (ref 0.67–1.17)
EGFRCR SERPLBLD CKD-EPI 2021: >90 ML/MIN/1.73M2
ERYTHROCYTE [DISTWIDTH] IN BLOOD BY AUTOMATED COUNT: 12.3 % (ref 10–15)
FASTING STATUS PATIENT QL REPORTED: YES
FASTING STATUS PATIENT QL REPORTED: YES
GLUCOSE SERPL-MCNC: 75 MG/DL (ref 70–99)
HCO3 SERPL-SCNC: 26 MMOL/L (ref 22–29)
HCT VFR BLD AUTO: 44.7 % (ref 40–53)
HDLC SERPL-MCNC: 60 MG/DL
HGB BLD-MCNC: 15.2 G/DL (ref 13.3–17.7)
LDLC SERPL CALC-MCNC: 142 MG/DL
MCH RBC QN AUTO: 29 PG (ref 26.5–33)
MCHC RBC AUTO-ENTMCNC: 34 G/DL (ref 31.5–36.5)
MCV RBC AUTO: 85 FL (ref 78–100)
NONHDLC SERPL-MCNC: 168 MG/DL
PLATELET # BLD AUTO: 305 10E3/UL (ref 150–450)
POTASSIUM SERPL-SCNC: 4.3 MMOL/L (ref 3.4–5.3)
RBC # BLD AUTO: 5.24 10E6/UL (ref 4.4–5.9)
SODIUM SERPL-SCNC: 139 MMOL/L (ref 135–145)
TRIGL SERPL-MCNC: 129 MG/DL
WBC # BLD AUTO: 5.7 10E3/UL (ref 4–11)

## 2024-12-10 PROCEDURE — 85027 COMPLETE CBC AUTOMATED: CPT | Performed by: PHYSICIAN ASSISTANT

## 2024-12-10 PROCEDURE — 99213 OFFICE O/P EST LOW 20 MIN: CPT | Mod: 25 | Performed by: PHYSICIAN ASSISTANT

## 2024-12-10 PROCEDURE — 90471 IMMUNIZATION ADMIN: CPT | Performed by: PHYSICIAN ASSISTANT

## 2024-12-10 PROCEDURE — 99395 PREV VISIT EST AGE 18-39: CPT | Mod: 25 | Performed by: PHYSICIAN ASSISTANT

## 2024-12-10 PROCEDURE — 80061 LIPID PANEL: CPT | Performed by: PHYSICIAN ASSISTANT

## 2024-12-10 PROCEDURE — 90746 HEPB VACCINE 3 DOSE ADULT IM: CPT | Performed by: PHYSICIAN ASSISTANT

## 2024-12-10 PROCEDURE — 36415 COLL VENOUS BLD VENIPUNCTURE: CPT | Performed by: PHYSICIAN ASSISTANT

## 2024-12-10 PROCEDURE — 80048 BASIC METABOLIC PNL TOTAL CA: CPT | Performed by: PHYSICIAN ASSISTANT

## 2024-12-10 RX ORDER — BUPROPION HYDROCHLORIDE 100 MG/1
100 TABLET, EXTENDED RELEASE ORAL 2 TIMES DAILY
Qty: 180 TABLET | Refills: 0 | Status: SHIPPED | OUTPATIENT
Start: 2024-12-10 | End: 2025-03-10

## 2024-12-10 ASSESSMENT — PAIN SCALES - GENERAL: PAINLEVEL_OUTOF10: NO PAIN (0)

## 2024-12-10 NOTE — PROGRESS NOTES
"Preventive Care Visit  [facility]  Remi Kothari PA-C, Physician Assistant  Dec 10, 2024      Assessment & Plan     (Z00.00) Routine general medical examination at a health care facility  (primary encounter diagnosis)  Comment:   Plan: Basic metabolic panel  (Ca, Cl, CO2, Creat,         Gluc, K, Na, BUN), Lipid panel reflex to direct        LDL Fasting, CBC with platelets          No new concerns today, screening labs/imaging and vaccines are currently up to date.  Continue with healthy lifestyle routines and follow up in 1 year, sooner if needed.      (F33.8) Seasonal affective disorder (H)  Comment:   Plan: buPROPion (WELLBUTRIN SR) 100 MG 12 hr tablet        Stable on wellbutrin, refill provided today      BMI  Estimated body mass index is 28.53 kg/m  as calculated from the following:    Height as of this encounter: 1.8 m (5' 10.87\").    Weight as of this encounter: 92.4 kg (203 lb 12.8 oz).       Counseling  Appropriate preventive services were addressed with this patient via screening, questionnaire, or discussion as appropriate for fall prevention, nutrition, physical activity, Tobacco-use cessation, social engagement, weight loss and cognition.  Checklist reviewing preventive services available has been given to the patient.  Reviewed patient's diet, addressing concerns and/or questions.           Subjective   AJ is a 34 year old, presenting for the following:  Physical (Requesting for buPROPion (WELLBUTRIN XL) 150 MG 24 hr tablet) and Establish Care        12/10/2024     9:00 AM   Additional Questions   Roomed by Minal LYNCH    No new concerns, chronic conditions stable, needs refill of wellbutrin for winter months, discussed with Dr Galeano in 9/2024 at visit.      Health Care Directive  Patient does not have a Health Care Directive: Discussed advance care planning with patient; information given to patient to review.      12/5/2024   General Health   How would you " rate your overall physical health? Good   Feel stress (tense, anxious, or unable to sleep) Only a little      (!) STRESS CONCERN      12/5/2024   Nutrition   Three or more servings of calcium each day? Yes   Diet: Regular (no restrictions)   How many servings of fruit and vegetables per day? 4 or more   How many sweetened beverages each day? 0-1            12/5/2024   Exercise   Days per week of moderate/strenous exercise 6 days   Average minutes spent exercising at this level 40 min            12/5/2024   Social Factors   Frequency of gathering with friends or relatives Once a week   Worry food won't last until get money to buy more No   Food not last or not have enough money for food? No   Do you have housing? (Housing is defined as stable permanent housing and does not include staying ouside in a car, in a tent, in an abandoned building, in an overnight shelter, or couch-surfing.) Yes   Are you worried about losing your housing? No   Lack of transportation? No   Unable to get utilities (heat,electricity)? No            12/5/2024   Dental   Dentist two times every year? Yes          Today's PHQ-9 Score:       9/10/2024     8:55 AM   PHQ-9 SCORE   PHQ-9 Total Score MyChart 4 (Minimal depression)   PHQ-9 Total Score 4           12/5/2024   Substance Use   Alcohol more than 3/day or more than 7/wk No   Do you use any other substances recreationally? No        Social History     Tobacco Use    Smoking status: Never     Passive exposure: Never    Smokeless tobacco: Never   Vaping Use    Vaping status: Never Used   Substance Use Topics    Alcohol use: Yes     Comment: Have reduced alcoholic drinks per week down to 2-3 from 8-10    Drug use: Never           12/5/2024   STI Screening   New sexual partner(s) since last STI/HIV test? No            12/5/2024   Contraception/Family Planning   Questions about contraception or family planning No           Reviewed and updated as needed this visit by Provider                 "    BP Readings from Last 3 Encounters:   12/10/24 130/83   09/11/24 137/84   04/30/24 (!) 145/81    Wt Readings from Last 3 Encounters:   12/10/24 92.4 kg (203 lb 12.8 oz)   09/11/24 90.9 kg (200 lb 6.4 oz)   04/30/24 93.4 kg (206 lb)            Objective    Exam  /83   Pulse 74   Temp 97.4  F (36.3  C) (Temporal)   Resp 18   Ht 1.8 m (5' 10.87\")   Wt 92.4 kg (203 lb 12.8 oz)   SpO2 99%   BMI 28.53 kg/m     Estimated body mass index is 28.53 kg/m  as calculated from the following:    Height as of this encounter: 1.8 m (5' 10.87\").    Weight as of this encounter: 92.4 kg (203 lb 12.8 oz).    Physical Exam  GENERAL: alert and no distress  EYES: Eyes grossly normal to inspection, PERRL and conjunctivae and sclerae normal  HENT: ear canals and TM's normal, nose and mouth without ulcers or lesions  NECK: no adenopathy, no asymmetry, masses, or scars  RESP: lungs clear to auscultation - no rales, rhonchi or wheezes  CV: regular rate and rhythm, normal S1 S2, no S3 or S4, no murmur, click or rub, no peripheral edema  ABDOMEN: soft, nontender, no hepatosplenomegaly, no masses and bowel sounds normal  MS: no gross musculoskeletal defects noted, no edema  SKIN: no suspicious lesions or rashes  NEURO: Normal strength and tone, mentation intact and speech normal  PSYCH: mentation appears normal, affect normal/bright        Signed Electronically by: Remi Kothari PA-C    "

## 2024-12-10 NOTE — LETTER
My Depression Action Plan  Name: Jeffy Silva   Date of Birth 1990  Date: 12/10/2024    My doctor: Denise Garrido   My clinic: [address]  [place] [id number] [phone]            GREEN    ZONE   Good Control    What it looks like:   Things are going generally well. You have normal ups and downs. You may even feel depressed from time to time, but bad moods usually last less than a day.   What you need to do:  Continue to care for yourself (see self care plan)  Check your depression survival kit and update it as needed  Follow your physician s recommendations including any medication.  Do not stop taking medication unless you consult with your physician first.             YELLOW         ZONE Getting Worse    What it looks like:   Depression is starting to interfere with your life.   It may be hard to get out of bed; you may be starting to isolate yourself from others.  Symptoms of depression are starting to last most all day and this has happened for several days.   You may have suicidal thoughts but they are not constant.   What you need to do:     Call your care team. Your response to treatment will improve if you keep your care team informed of your progress. Yellow periods are signs an adjustment may need to be made.     Continue your self-care.  Just get dressed and ready for the day.  Don't give yourself time to talk yourself out of it.    Talk to someone in your support network.    Open up your Depression Self-Care Plan/Wellness Kit.             RED    ZONE Medical Alert - Get Help    What it looks like:   Depression is seriously interfering with your life.   You may experience these or other symptoms: You can t get out of bed most days, can t work or engage in other necessary activities, you have trouble taking care of basic hygiene, or basic responsibilities, thoughts of suicide or death that will not go away,  self-injurious behavior.     What you need to do:  Call your care team and request a same-day appointment. If they are not available (weekends or after hours) call your local crisis line, emergency room or 911.          Depression Self-Care Plan / Wellness Kit    Many people find that medication and therapy are helpful treatments for managing depression. In addition, making small changes to your everyday life can help to boost your mood and improve your wellbeing. Below are some tips for you to consider. Be sure to talk with your medical provider and/or behavioral health consultant if your symptoms are worsening or not improving.     Sleep   Sleep hygiene  means all of the habits that support good, restful sleep. It includes maintaining a consistent bedtime and wake time, using your bedroom only for sleeping or sex, and keeping the bedroom dark and free of distractions like a computer, smartphone, or television.     Develop a Healthy Routine  Maintain good hygiene. Get out of bed in the morning, make your bed, brush your teeth, take a shower, and get dressed. Don t spend too much time viewing media that makes you feel stressed. Find time to relax each day.    Exercise  Get some form of exercise every day. This will help reduce pain and release endorphins, the  feel good  chemicals in your brain. It can be as simple as just going for a walk or doing some gardening, anything that will get you moving.      Diet  Strive to eat healthy foods, including fruits and vegetables. Drink plenty of water. Avoid excessive sugar, caffeine, alcohol, and other mood-altering substances.     Stay Connected with Others  Stay in touch with friends and family members.    Manage Your Mood  Try deep breathing, massage therapy, biofeedback, or meditation. Take part in fun activities when you can. Try to find something to smile about each day.     Psychotherapy  Be open to working with a therapist if your provider recommends it.      Medication  Be sure to take your medication as prescribed. Most anti-depressants need to be taken every day. It usually takes several weeks for medications to work. Not all medicines work for all people. It is important to follow-up with your provider to make sure you have a treatment plan that is working for you. Do not stop your medication abruptly without first discussing it with your provider.    Crisis Resources   These hotlines are for both adults and children. They and are open 24 hours a day, 7 days a week unless noted otherwise.    National Suicide Prevention Lifeline   988 or 9-215-573-SZFB (7344)    Crisis Text Line    www.crisistextline.org  Text HOME to 007621 from anywhere in the [place], anytime, about any type of crisis. A live, trained crisis counselor will receive the text and respond quickly.    [facility] Lifeline for LGBTQ Youth  A national crisis intervention and suicide lifeline for LGBTQ youth under 25. Provides a safe place to talk without judgement. Call 1-207.866.5088; text START to 117062 or visit www.thetrevorproject.org to talk to a trained counselor.    For [facility] crisis numbers, visit the [facility] website at:  https://[place].gov/dhs/people-we-serve/adults/health-care/mental-health/resources/crisis-contacts.jsp

## 2024-12-10 NOTE — PATIENT INSTRUCTIONS
Patient Education   Preventive Care Advice   This is general advice given by our system to help you stay healthy. However, your care team may have specific advice just for you. Please talk to your care team about your preventive care needs.  Nutrition  Eat 5 or more servings of fruits and vegetables each day.  Try wheat bread, brown rice and whole grain pasta (instead of white bread, rice, and pasta).  Get enough calcium and vitamin D. Check the label on foods and aim for 100% of the RDA (recommended daily allowance).  Lifestyle  Exercise at least 150 minutes each week  (30 minutes a day, 5 days a week).  Do muscle strengthening activities 2 days a week. These help control your weight and prevent disease.  No smoking.  Wear sunscreen to prevent skin cancer.  Have a dental exam and cleaning every 6 months.  Yearly exams  See your health care team every year to talk about:  Any changes in your health.  Any medicines your care team has prescribed.  Preventive care, family planning, and ways to prevent chronic diseases.  Shots (vaccines)   HPV shots (up to age 26), if you've never had them before.  Hepatitis B shots (up to age 59), if you've never had them before.  COVID-19 shot: Get this shot when it's due.  Flu shot: Get a flu shot every year.  Tetanus shot: Get a tetanus shot every 10 years.  Pneumococcal, hepatitis A, and RSV shots: Ask your care team if you need these based on your risk.  Shingles shot (for age 50 and up)  General health tests  Diabetes screening:  Starting at age 35, Get screened for diabetes at least every 3 years.  If you are younger than age 35, ask your care team if you should be screened for diabetes.  Cholesterol test: At age 39, start having a cholesterol test every 5 years, or more often if advised.  Bone density scan (DEXA): At age 50, ask your care team if you should have this scan for osteoporosis (brittle bones).  Hepatitis C: Get tested at least once in your life.  STIs (sexually  transmitted infections)  Before age 24: Ask your care team if you should be screened for STIs.  After age 24: Get screened for STIs if you're at risk. You are at risk for STIs (including HIV) if:  You are sexually active with more than one person.  You don't use condoms every time.  You or a partner was diagnosed with a sexually transmitted infection.  If you are at risk for HIV, ask about PrEP medicine to prevent HIV.  Get tested for HIV at least once in your life, whether you are at risk for HIV or not.  Cancer screening tests  Cervical cancer screening: If you have a cervix, begin getting regular cervical cancer screening tests starting at age 21.  Breast cancer scan (mammogram): If you've ever had breasts, begin having regular mammograms starting at age 40. This is a scan to check for breast cancer.  Colon cancer screening: It is important to start screening for colon cancer at age 45.  Have a colonoscopy test every 10 years (or more often if you're at risk) Or, ask your provider about stool tests like a FIT test every year or Cologuard test every 3 years.  To learn more about your testing options, visit:   .  For help making a decision, visit:   https://bit.ly/in05369.  Prostate cancer screening test: If you have a prostate, ask your care team if a prostate cancer screening test (PSA) at age 55 is right for you.  Lung cancer screening: If you are a current or former smoker ages 50 to 80, ask your care team if ongoing lung cancer screenings are right for you.  For informational purposes only. Not to replace the advice of your health care provider. Copyright   2023 [employer]. All rights reserved. Clinically reviewed by the [facility]. [employer] 904138 - REV 01/24.

## 2024-12-10 NOTE — NURSING NOTE
Prior to immunization administration, verified patients identity using patient s name and date of birth. Please see Immunization Activity for additional information.     Screening Questionnaire for Adult Immunization    Are you sick today?   No   Do you have allergies to medications, food, a vaccine component or latex?   No   Have you ever had a serious reaction after receiving a vaccination?   No   Do you have a long-term health problem with heart, lung, kidney, or metabolic disease (e.g., diabetes), asthma, a blood disorder, no spleen, complement component deficiency, a cochlear implant, or a spinal fluid leak?  Are you on long-term aspirin therapy?   No   Do you have cancer, leukemia, HIV/AIDS, or any other immune system problem?   No   Do you have a parent, brother, or sister with an immune system problem?   No   In the past 3 months, have you taken medications that affect  your immune system, such as prednisone, other steroids, or anticancer drugs; drugs for the treatment of rheumatoid arthritis, Crohn s disease, or psoriasis; or have you had radiation treatments?   No   Have you had a seizure, or a brain or other nervous system problem?   No   During the past year, have you received a transfusion of blood or blood    products, or been given immune (gamma) globulin or antiviral drug?   No   For women: Are you pregnant or is there a chance you could become       pregnant during the next month?   No   Have you received any vaccinations in the past 4 weeks?   No     Immunization questionnaire answers were all negative.        Patient instructed to remain in clinic for 15 minutes afterwards, and to report any adverse reactions.     Screening performed by Alvaro Valdez MA on 12/10/2024 at 9:29 AM.

## 2024-12-23 DIAGNOSIS — L20.81 ATOPIC NEURODERMATITIS: ICD-10-CM

## 2024-12-23 RX ORDER — DUPILUMAB 300 MG/2ML
300 INJECTION, SOLUTION SUBCUTANEOUS
Qty: 12 ML | Refills: 1 | Status: SHIPPED | OUTPATIENT
Start: 2024-12-23

## 2024-12-23 NOTE — TELEPHONE ENCOUNTER
Requested Prescriptions   Pending Prescriptions Disp Refills    dupilumab (DUPIXENT) 300 MG/2ML prefilled pen       Sig: Inject subcutaneously.       There is no refill protocol information for this order           Last office visit: 10/19/2023 ; last virtual visit: Visit date not found with prescribing provider:  Coretta Santiago     Future Office Visit:      Yvonne Raymond   [facility] [place]   [facility]  [phone]

## 2025-01-04 ENCOUNTER — TELEPHONE (OUTPATIENT)
Dept: DERMATOLOGY | Facility: CLINIC | Age: 35
End: 2025-01-04
Payer: COMMERCIAL

## 2025-01-04 NOTE — TELEPHONE ENCOUNTER
PA Needed    Medication: DUPIXENT 300MG/2ML SOAJ  QTY/DS: 4 FOR 28 DAYS  NEW INS: N/A  Insurance Company: [facility] - Phone [phone] Fax [phone]  Pharmacy Filling the Rx: [facility] - [place], [place] - [address]  PA :  N/A  Date of last fill: N/A

## 2025-01-07 NOTE — TELEPHONE ENCOUNTER
PA Initiation    Medication: DUPIXENT 300 MG/2ML SC SOAJ  Insurance Company: [employer] [place] - Phone [phone] Fax [phone]  Pharmacy Filling the Rx: [facility] - [place], [place] - [address]  Filling Pharmacy Phone: [phone]  Filling Pharmacy Fax: [phone]  Start Date: 1/7/2025    [id number]

## 2025-01-08 NOTE — TELEPHONE ENCOUNTER
Prior Authorization Approval    Medication: DUPIXENT 300 MG/2ML SC SOAJ  Authorization Effective Date: 12/9/2024  Authorization Expiration Date: 7/8/2025  Approved Dose/Quantity: 4ml per 28 days  Reference #: [id number]   Insurance Company: [employer] [place] - Phone [phone] Fax [phone]  Expected CoPay: $ 25  CoPay Card Available:      Financial Assistance Needed: No  Which Pharmacy is filling the prescription: [facility] - [place], [place] - [address]  Pharmacy Notified: Yes  Patient Notified: By pharmacy

## 2025-03-10 DIAGNOSIS — F33.8 SEASONAL AFFECTIVE DISORDER: ICD-10-CM

## 2025-03-10 RX ORDER — BUPROPION HYDROCHLORIDE 100 MG/1
100 TABLET, EXTENDED RELEASE ORAL 2 TIMES DAILY
Qty: 180 TABLET | Refills: 2 | Status: SHIPPED | OUTPATIENT
Start: 2025-03-10

## 2025-06-25 ENCOUNTER — TELEPHONE (OUTPATIENT)
Dept: DERMATOLOGY | Facility: CLINIC | Age: 35
End: 2025-06-25
Payer: COMMERCIAL

## 2025-06-25 NOTE — TELEPHONE ENCOUNTER
PA Initiation    Medication: DUPIXENT 300 MG/2ML SC SOAJ  Insurance Company: [employer] [place] - Phone [phone] Fax [phone]  Pharmacy Filling the Rx:    Filling Pharmacy Phone:    Filling Pharmacy Fax:    Start Date: 6/25/2025      Key: [id number]

## 2025-06-26 NOTE — TELEPHONE ENCOUNTER
Prior Authorization Approval    Medication: DUPIXENT 300 MG/2ML SC SOAJ  Authorization Effective Date: 5/26/2025  Authorization Expiration Date: 6/25/2026  Approved Dose/Quantity: 4ml for 28 days  Reference #: Key: [id number]   Insurance Company: [employer] [place] - Phone [phone] Fax [phone]  Expected CoPay: $    CoPay Card Available: No    Financial Assistance Needed: no  Which Pharmacy is filling the prescription: [facility] - [facility] [address]  Pharmacy Notified: yes  Patient Notified: not needed-renewal